# Patient Record
Sex: MALE | Race: WHITE
[De-identification: names, ages, dates, MRNs, and addresses within clinical notes are randomized per-mention and may not be internally consistent; named-entity substitution may affect disease eponyms.]

---

## 2017-01-27 ENCOUNTER — HOSPITAL ENCOUNTER (EMERGENCY)
Dept: HOSPITAL 25 - UCEAST | Age: 72
Discharge: HOME | End: 2017-01-27
Payer: MEDICARE

## 2017-01-27 VITALS — DIASTOLIC BLOOD PRESSURE: 71 MMHG | SYSTOLIC BLOOD PRESSURE: 156 MMHG

## 2017-01-27 DIAGNOSIS — M25.532: Primary | ICD-10-CM

## 2017-01-27 DIAGNOSIS — M19.032: ICD-10-CM

## 2017-01-27 DIAGNOSIS — Z88.5: ICD-10-CM

## 2017-01-27 DIAGNOSIS — Z88.8: ICD-10-CM

## 2017-01-27 DIAGNOSIS — M79.645: ICD-10-CM

## 2017-01-27 PROCEDURE — G0463 HOSPITAL OUTPT CLINIC VISIT: HCPCS

## 2017-01-27 PROCEDURE — 99213 OFFICE O/P EST LOW 20 MIN: CPT

## 2017-01-27 NOTE — RAD
HISTORY: Subacute trauma, persistent left wrist pain



COMPARISONS: None



VIEWS: 3, Frontal, lateral, and oblique views left wrist



FINDINGS:



BONE DENSITY: Normal.

BONES: There is a probable nondisplaced fracture of the scaphoid.    

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

articulations.    

ALIGNMENT: There is no dislocation. 

SOFT TISSUES: Unremarkable.



OTHER FINDINGS: None.



IMPRESSION: 

OSTEOARTHRITIS WITH A PROBABLE NONDISPLACED FRACTURE OF THE SCAPHOID

## 2017-01-27 NOTE — UC
Hand/Wrist HPI





- HPI Summary


HPI Summary: 


FOOSH LEFT HAND 1 MONTH AGO AFTER SLIPPING ON ICE. WRIST PAIN HAS IMPROVED BUT 

PT HAS PERSISTENT PAIN AND SWELLING IN THUMB. 





- History Of Current Complaint


Chief Complaint: UCUpperExtremity


Stated Complaint: WRIST INJURY


Time Seen by Provider: 01/27/17 08:36


Hx Obtained From: Patient


Onset/Duration: Sudden Onset, Lasting Weeks, Still Present


Severity Initially: Moderate


Severity Currently: Mild


Pain Intensity: 1


Pain Scale Used: 0-10 Numeric


Character Of Pain: Aching


Aggravating Factor(s): Movement


Alleviating: Rest


Associated Signs And Symptoms: Positive: Swelling


Related History: Dominant Hand Right





- Allergies/Home Medications


Allergies/Adverse Reactions: 


 Allergies











Allergy/AdvReac Type Severity Reaction Status Date / Time


 


Colesevelam [From Welchol] Allergy Severe Diarrhea Verified 01/27/17 08:16


 


Oxycodone Allergy Intermediate Rash Verified 01/27/17 08:16


 


Statins Allergy Intermediate Rash Verified 01/27/17 08:16


 


Dust Mite Extract Allergy Mild See Comment Verified 01/27/17 08:16


 


Molds & Smuts Allergy Mild Rash Verified 01/27/17 08:16


 


Atorvastatin [From Lipitor] Allergy  Unknown Verified 01/27/17 08:16





   Reaction  





   Details  


 


Cholestyramine Allergy  Rash Verified 01/27/17 08:16


 


Fenofibrate Allergy  Unknown Verified 01/27/17 08:16





   Reaction  





   Details  


 


Gemfibrozil Allergy  Unknown Verified 01/27/17 08:16





   Reaction  





   Details  


 


Niacin [From Niaspan] Allergy  Unknown Verified 01/27/17 08:16





   Reaction  





   Details  


 


Pravastatin Allergy  Unknown Verified 01/27/17 08:16





   Reaction  





   Details  


 


Simvastatin [From Zocor] Allergy  Unknown Verified 01/27/17 08:16





   Reaction  





   Details  


 


Tamsulosin [From Flomax] Allergy  Unknown Verified 01/27/17 08:16





   Reaction  





   Details  


 


cat dander* Allergy Intermediate Sneezing Uncoded 11/09/16 15:31











Home Medications: 


 Home Medications





Docusate CAP* [Colace Cap*] 1 tab PO PRN 01/27/17 [History]


Metoprolol Tartrate TAB* [Lopressor TAB*] 1 tab PO DAILY 01/27/17 [History 

Confirmed 01/27/17]


Polyethylene Glycol 3350* [Miralax*] 1 packet PO PRN 01/27/17 [History]











PMH/Surg Hx/FS Hx/Imm Hx


Endocrine History Of: 


   Denies: Diabetes, Thyroid Disease


Cardiovascular History Of: Reports: Cardiac Disorders - poss will need pacemaker

, Hypertension


   Denies: Pacemaker/ICD


Respiratory History Of: Reports: Asthma


   Denies: COPD


GI/ History Of: 


   Denies: Ulcer, Renal Disease


Neurological History Of: Reports: TIA - 2015


Psychological History Of: Reports: Anxiety


Cancer History Of: Reports: Prostate Cancer





- Surgical History


Surgical History: Yes


Surgery Procedure, Year, and Place: 1990's Deviated septum surgery;.  2012 Jan 

- Bone spur removal Left foot.  VASECTOMY





- Family History


Known Family History: Positive: Cardiac Disease, Diabetes


Family History: Fhx of HLD





- Social History


Alcohol Use: None


Substance Use Type: None


Smoking Status (MU): Never Smoked Tobacco





- Immunization History


Most Recent Influenza Vaccination: 2016/2017





Review of Systems


Constitutional: Negative


Skin: Negative


Respiratory: Negative


Cardiovascular: Negative


Gastrointestinal: Negative


Musculoskeletal: Arthralgia, Decreased ROM, Edema


All Other Systems Reviewed And Are Negative: Yes





Physical Exam


Triage Information Reviewed: Yes


Appearance: Well-Appearing, No Pain Distress, Well-Nourished


Vital Signs: 


 Initial Vital Signs











Temp  96.9 F   01/27/17 08:17


 


Pulse  64   01/27/17 08:17


 


Resp  17   01/27/17 08:17


 


BP  156/71   01/27/17 08:17


 


Pulse Ox  98   01/27/17 08:17











Vital Signs Reviewed: Yes


Eyes: Positive: Conjunctiva Clear


ENT: Positive: Hearing grossly normal


Neck: Positive: Supple


Respiratory: Positive: No respiratory distress, No accessory muscle use


Cardiovascular: Positive: Pulses Normal


Abdomen Description: Positive: Soft


Musculoskeletal: Positive: ROM Limited @ - LEFT THUMB AND WRIST, Edema @ - 

SLIGHT SWELLING LEFT 1ST MCP JOINT, Other: - NEG FINKELSTEINS. MILD TENDERNESS 

OVER CARPAL BONES AND OVER ANATOMICAL SNUFFBOX LEFT WRIST


Neurological: Positive: Alert


Psychological: Positive: Age Appropriate Behavior


Skin: Negative: rashes





Diagnostics





- Radiology


  ** LEFT WRIST XRAY


Xray Interpretation: Positive (See Comments) - OSTEOARTHRITIS WITH A PROBABLE 

NONDISPLACED FRACTURE OF THE SCAPHOID


Radiology Interpretation Completed By: Radiologist





  ** LEFT THUMB XRAY


Xray Interpretation: No Acute Changes


Radiology Interpretation Completed By: Radiologist





Hand/Wrist Course/Dx





- Differential Dx/Diagnosis


Provider Diagnoses: LEFT SCAPHOID FRACTURE





Discharge





- Discharge Plan


Condition: Stable


Disposition: HOME


Patient Education Materials:  Scaphoid Fracture (ED)


Referrals: 


Rashaad Vital MD [Medical Doctor] - 1 Week


Hal Hinojosa MD [Primary Care Provider] - If Needed


Additional Instructions: 


KEEP THE SPLINT ON AT ALL TIMES UNTIL OTHERWISE ADVISED BY ORTHO. OKAY TO 

REMOVE FOR A QUICK SHOWER.

## 2017-01-27 NOTE — RAD
Indication: Fall, persistent left thumb pain.



3 views of the left thumb demonstrates no fracture. There is bony fragment which may be

sequela from prior injury at the dorsal aspect of the interphalangeal joint. Degenerative

changes of the trapezium first metacarpal joint is noted.



IMPRESSION: Likely sequela of prior injury with no definite recent fracture.

## 2017-02-17 ENCOUNTER — HOSPITAL ENCOUNTER (OUTPATIENT)
Dept: HOSPITAL 25 - ED | Age: 72
Setting detail: OBSERVATION
Discharge: HOME | End: 2017-02-17
Attending: HOSPITALIST | Admitting: HOSPITALIST
Payer: MEDICARE

## 2017-02-17 VITALS — SYSTOLIC BLOOD PRESSURE: 166 MMHG | DIASTOLIC BLOOD PRESSURE: 79 MMHG

## 2017-02-17 DIAGNOSIS — R07.9: Primary | ICD-10-CM

## 2017-02-17 DIAGNOSIS — Z88.8: ICD-10-CM

## 2017-02-17 DIAGNOSIS — R00.1: ICD-10-CM

## 2017-02-17 DIAGNOSIS — F41.9: ICD-10-CM

## 2017-02-17 DIAGNOSIS — Z79.899: ICD-10-CM

## 2017-02-17 DIAGNOSIS — K21.9: ICD-10-CM

## 2017-02-17 DIAGNOSIS — M19.032: ICD-10-CM

## 2017-02-17 DIAGNOSIS — Z79.82: ICD-10-CM

## 2017-02-17 DIAGNOSIS — I10: ICD-10-CM

## 2017-02-17 DIAGNOSIS — E78.5: ICD-10-CM

## 2017-02-17 DIAGNOSIS — Z85.46: ICD-10-CM

## 2017-02-17 LAB
ALBUMIN SERPL BCG-MCNC: 3.9 G/DL (ref 3.2–5.2)
ALP SERPL-CCNC: 81 U/L (ref 34–104)
ALT SERPL W P-5'-P-CCNC: 14 U/L (ref 7–52)
ANION GAP SERPL CALC-SCNC: 7 MMOL/L (ref 2–11)
AST SERPL-CCNC: 15 U/L (ref 13–39)
BUN SERPL-MCNC: 10 MG/DL (ref 6–24)
BUN/CREAT SERPL: 13.3 (ref 8–20)
CALCIUM SERPL-MCNC: 9.5 MG/DL (ref 8.6–10.3)
CHLORIDE SERPL-SCNC: 105 MMOL/L (ref 101–111)
GLOBULIN SER CALC-MCNC: 2.9 G/DL (ref 2–4)
GLUCOSE SERPL-MCNC: 96 MG/DL (ref 70–100)
HCO3 SERPL-SCNC: 28 MMOL/L (ref 22–32)
HCT VFR BLD AUTO: 40 % (ref 42–52)
HGB BLD-MCNC: 13.6 G/DL (ref 14–18)
MAGNESIUM SERPL-MCNC: 2.3 MG/DL (ref 1.9–2.7)
MCH RBC QN AUTO: 30 PG (ref 27–31)
MCHC RBC AUTO-ENTMCNC: 34 G/DL (ref 31–36)
MCV RBC AUTO: 89 FL (ref 80–94)
POTASSIUM SERPL-SCNC: 3.4 MMOL/L (ref 3.5–5)
PROT SERPL-MCNC: 6.8 G/DL (ref 6.4–8.9)
RBC # BLD AUTO: 4.55 10^6/UL (ref 4–5.4)
SODIUM SERPL-SCNC: 140 MMOL/L (ref 133–145)
TROPONIN I SERPL-MCNC: 0 NG/ML (ref ?–0.04)
WBC # BLD AUTO: 5.2 10^3/UL (ref 3.5–10.8)

## 2017-02-17 PROCEDURE — 84484 ASSAY OF TROPONIN QUANT: CPT

## 2017-02-17 PROCEDURE — 93005 ELECTROCARDIOGRAM TRACING: CPT

## 2017-02-17 PROCEDURE — 71020: CPT

## 2017-02-17 PROCEDURE — 36415 COLL VENOUS BLD VENIPUNCTURE: CPT

## 2017-02-17 PROCEDURE — 99284 EMERGENCY DEPT VISIT MOD MDM: CPT

## 2017-02-17 PROCEDURE — 93017 CV STRESS TEST TRACING ONLY: CPT

## 2017-02-17 PROCEDURE — G0378 HOSPITAL OBSERVATION PER HR: HCPCS

## 2017-02-17 PROCEDURE — A9502 TC99M TETROFOSMIN: HCPCS

## 2017-02-17 PROCEDURE — 85025 COMPLETE CBC W/AUTO DIFF WBC: CPT

## 2017-02-17 PROCEDURE — 83605 ASSAY OF LACTIC ACID: CPT

## 2017-02-17 PROCEDURE — 80053 COMPREHEN METABOLIC PANEL: CPT

## 2017-02-17 PROCEDURE — 96372 THER/PROPH/DIAG INJ SC/IM: CPT

## 2017-02-17 PROCEDURE — 78452 HT MUSCLE IMAGE SPECT MULT: CPT

## 2017-02-17 PROCEDURE — 83735 ASSAY OF MAGNESIUM: CPT

## 2017-02-17 PROCEDURE — 85610 PROTHROMBIN TIME: CPT

## 2017-02-17 NOTE — RAD
INDICATION: Chest pain



COMPARISON: Most recent comparison chest x-rays dated November 04, 2016



TECHNIQUE: PA and lateral views of the chest were obtained.



FINDINGS:



The heart and mediastinum are normal in size and contour.



The lungs are grossly clear. There is no evidence of large pleural effusion.



Visualized bones are normal for the patient's age.



There is no radiographic evidence of free air beneath the diaphragm



IMPRESSION: 

No radiographic evidence of acute cardiopulmonary disease.

## 2017-02-17 NOTE — ED
Hernan MAE Billy, scribed for Delroy Lopes MD on 02/17/17 at 0452 .





HPI Chest Pain





- HPI Summary


HPI Summary: 


Patient is a 71 year-old male coming to [facility] presenting with constant left 

anterior chest pain since 0240 this morning. Severity 2/10. He states that the 

pain is worse with deep breaths. He has had similar symptoms in the past before

, although he cannot recall his last episode prior to today. He is unable to 

describe the quality of his pain today, but he states it is "constant but not 

stabbing." ASA taken PTA. He has had 2 weeks of cold-like symptoms prior to 

today. Denies any fevers, although Dr. Hinojosa recently prescribed him 

antibiotics. Follows Dr. Smith for cardiology.





- History of Current Complaint


Chief Complaint: EDChestPainROMI


Time Seen by Provider: 02/17/17 04:48


Hx Obtained From: Patient


Onset/Duration: Started Hours Ago, Still Present


Time of Onset: 02:40


Timing: Constant


Initial Severity: Moderate


Current Severity: Moderate


Pain Intensity: 2


Pain Scale Used: 0-10 Numeric


Chest Pain Location: Left Anterior


Chest Pain Radiates: No


Aggravating Factor(s): Deep Breaths


Alleviating Factor(s): Nothing


Associated Signs and Symptoms: Positive: Chest Pain





- Additional Pertinent History


Primary Care Physician: JUSTEN





- Allergy/Home Medications


Allergies/Adverse Reactions: 


 Allergies











Allergy/AdvReac Type Severity Reaction Status Date / Time


 


Colesevelam [From Welchol] Allergy Severe Diarrhea Verified 02/17/17 05:47


 


Oxycodone Allergy Intermediate Rash Verified 02/17/17 05:47


 


Statins Allergy Intermediate Rash Verified 02/17/17 05:47


 


Dust Mite Extract Allergy Mild See Comment Verified 02/17/17 05:47


 


Molds & Smuts Allergy Mild Rash Verified 02/17/17 05:47


 


Atorvastatin [From Lipitor] Allergy  Unknown Verified 02/17/17 05:47





   Reaction  





   Details  


 


Cholestyramine Allergy  Rash Verified 02/17/17 05:47


 


Fenofibrate Allergy  Unknown Verified 02/17/17 05:47





   Reaction  





   Details  


 


Gemfibrozil Allergy  Unknown Verified 02/17/17 05:47





   Reaction  





   Details  


 


Niacin [From Niaspan] Allergy  Unknown Verified 02/17/17 05:47





   Reaction  





   Details  


 


Pravastatin Allergy  Unknown Verified 02/17/17 05:47





   Reaction  





   Details  


 


Simvastatin [From Zocor] Allergy  Unknown Verified 02/17/17 05:47





   Reaction  





   Details  


 


Tamsulosin [From Flomax] Allergy  Unknown Verified 02/17/17 05:47





   Reaction  





   Details  


 


cat dander* Allergy Intermediate Sneezing Uncoded 02/17/17 05:47











Home Medications: 


 Home Medications





Verapamil SR TAB* [Calan Sr TAB*] 120 mg PO QPM 02/17/17 [History Confirmed 02/ 17/17]











PMH/Surg Hx/FS Hx/Imm Hx


Endocrine/Hematology History: 


   Denies: Hx Diabetes, Hx Thyroid Disease


Cardiovascular History: Reports: Hx Hypercholesterolemia, Hx Hypertension


   Denies: Hx Pacemaker/ICD


Respiratory History: Reports: Hx Asthma


   Denies: Hx Chronic Obstructive Pulmonary Disease (COPD)


GI History: 


   Denies: Hx Ulcer


 History: Reports: Other  Problems/Disorders - prostate CA in 2015


   Denies: Hx Renal Disease


Sensory History: Reports: Hx Contacts or Glasses


   Denies: Hx Hearing Aid


Opthamlomology History: Reports: Hx Contacts or Glasses


Neurological History: Reports: Hx Transient Ischemic Attacks (TIA) - 2015


Psychiatric History: Reports: Hx Anxiety


   Denies: Hx Panic Disorder





- Cancer History


Cancer Type, Location and Year: prostate CA, 2015





- Surgical History


Surgery Procedure, Year, and Place: 1990's Deviated septum surgery;.  2012 Jan 

- Bone spur removal Left foot.  VASECTOMY


Infectious Disease History: Yes


Infectious Disease History: Reports: Hx Shingles - 2014


   Denies: Hx Clostridium Difficile, Hx Hepatitis, Hx Human Immunodeficiency 

Virus (HIV), Hx of Known/Suspected MRSA, Hx Tuberculosis, Hx Known/Suspected VRE

, Hx Known/Suspected VRSA, History Other Infectious Disease, Traveled Outside 

the [place] in Last 30 Days





- Family History


Known Family History: Positive: Cardiac Disease, Diabetes


Family History: Fhx of HLD





- Social History


Alcohol Use: None


Substance Use Type: Reports: None


Hx Tobacco Use: No


Smoking Status (MU): Never Smoked Tobacco





Review of Systems


Negative: Fever


Positive: Chest Pain


Positive: Other - left wrist sprain


All Other Systems Reviewed And Are Negative: Yes





Physical Exam


Triage Information Reviewed: Yes


Vital Signs On Initial Exam: 


 Initial Vitals











Temp Pulse Resp BP Pulse Ox


 


 97.9 F   62   18   154/90   98 


 


 02/17/17 04:28  02/17/17 04:28  02/17/17 04:28  02/17/17 04:28  02/17/17 04:28











Vital Signs Reviewed: Yes


Appearance: Positive: Well-Appearing, No Pain Distress


Skin: Positive: Warm


Head/Face: Positive: Normal Head/Face Inspection


Eyes: Positive: PANCHO


ENT: Positive: Hearing grossly normal


Neck: Positive: Supple


Respiratory/Lung Sounds: Positive: Clear to Auscultation, Breath Sounds Present


Cardiovascular: Positive: RRR


Abdomen Description: Positive: Nontender, Soft


Bowel Sounds: Positive: Present


Musculoskeletal: Positive: Strength/ROM Intact


Neurological: Positive: Sensory/Motor Intact, Alert, Oriented to Person Place, 

Time





Diagnostics





- Vital Signs


 Vital Signs











  Temp Pulse Resp BP Pulse Ox


 


 02/17/17 04:28  97.9 F  62  18  154/90  98














- Laboratory


Result Diagrams: 


 02/17/17 05:15





 02/17/17 05:15


Lab Statement: Any lab studies that have been ordered have been reviewed, and 

results considered in the medical decision making process.





- Radiology


  ** CXR


Xray Interpretation: No Acute Changes


Radiology Interpretation Completed By: ED Physician





- EKG


  ** 0443


EKG Interpretation: NSR 60 bpm, non-specific ST abnormalities.





Chest Pain Course/Dx





- Diagnoses


Provider Diagnoses: 


 ACS (acute coronary syndrome)








- Provider Notifications


Discussed Care Of Patient With: Dr. Pederson (hospitalist) @ 0545: accepts 

admission.





Discharge





- Discharge Plan


Condition: Fair


Disposition: ADMITTED TO [facility]


Referrals: 


Hal Hniojosa MD [Primary Care Provider] - 





The documentation as recorded by the Hernan leung Billy accurately reflects the 

service I personally performed and the decisions made by me, Delroy Lopes MD.

## 2017-02-17 NOTE — DCNOTE
Patient seen this afternoon. No recurrence of symptoms. Feels well.


On exam, RRR, s1 and s2 present, SASHA, lungs cta B/L, abd soft, NTND, BS+


D/C home with PCP f/u. Complete outpatient ABx therapy.

## 2017-02-17 NOTE — HP
HISTORY AND PHYSICAL:

 

DATE OF ADMISSION:  17

 

CHIEF COMPLAINT:  Chest pain.

 

HISTORY OF PRESENT ILLNESS:  The patient is a 71-year-old gentleman who said 
about 2:40 this morning he woke up to urinate which he often does, and then 
suddenly felt some chest pain.  He was not exerting himself.  The patient was 
about 2/10 in severity.  It was left hand side of his chest, and was an achy 
type pain.  He has had no radiation of the pain.  No shortness of breath.  He 
was not sweating.  He was not dizzy.  When he was got to the hospital, his 
anxiety was relieved and his pain resolved without any medications.  It should 
be noted he worked out just yesterday for 45 minutes with no pain.

 

PAST MEDICAL HISTORY:  Significant for hypertension, hyperlipidemia, prostate 
cancer, status post radiation therapy in , BPH, anxiety, and GERD.

 

MEDICATIONS:  His current medications are as follows:

1.  Verapamil 120 mg in the evening.

2.  Astepro one spray both nares twice daily.

3.  Aspirin 81 mg daily.

4.  Albuterol inhaler one puff four times a day as needed.

5.  Docusate one tab twice a day.

6.  Calcium carbonate with cholecalciferol one tablet twice a week.

7.  Lisinopril 40 mg daily.

8.  Evolocumab 140 mg subcu q. 14 days.

9.  Flaxseed two capsules daily.

10.  Losartan 50 mg twice daily.

11.  Ibuprofen 200 mg every 8 hours as needed.

12.  Metoprolol tartrate one tab daily.

13.  Potassium chloride 20 mEq twice daily.

14.  MiraLax one pack daily.

15.  Verapamil 240 mg in the morning.

16.  Dyazide 37.5/25 one capsule daily.

17.  Clonazepam 0.5 mg three times a day as needed.

18.  Tylenol 650 mg every 6 hours as needed.

 

ALLERGIES/ADVERSE REACTIONS:  

1.  WELCHOL.

2.  OXYCODONE.

3.  STATINS.

4.  DUST MITE EXTRACT, MOLDS AND SMUTS.

5.  LIPITOR.

6.  CHOLESTYRAMINE.

7.  FENOFIBRATE.

8.  GEMFIBROZIL.

9.  NIACIN.

10. PRAVASTATIN.

11. SIMVASTATIN.

12. TAMSULOSIN.

13. CAT DANDER.

 

FAMILY HISTORY:  Mother  at 83 from diabetes and MI.  Father  at age 67 
of an unknown cause.

 

SOCIAL HISTORY:  No tobacco, alcohol, or recreational drug use.  Retired.  
Works for [employer].  He is .  His wife, Monse Herrera, is his 
healthcare proxy.  He has two children.

 

REVIEW OF SYSTEMS:  A 14-point review of systems was completed with the 
patient. All pertinent positives and negatives are in the history of present 
illness; otherwise is negative.

 

                               PHYSICAL EXAMINATION

 

GENERAL:  Pleasant gentleman lying in bed, in no acute distress.

 

VITAL SIGNS:  Blood pressure 150/74, pulse ox 97%, respiratory rate 14 breaths 
per minute, heart rate 65 beats per minute, temperature 97.9 degrees.

 

HEENT:  Normocephalic, atraumatic.  Pupils are equal, round, and reactive to 
light. Moist mucous membranes.

 

NECK:  Supple.  No JVD, bruits, palpable thyroid, or lymphadenopathy.

 

CHEST:  Clear to auscultation and percussion bilaterally.

 

CARDIOVASCULAR:  S1, S2 appreciated.  Regular rate and rhythm.

 

ABDOMEN:  Positive bowel sounds in all 4 quadrants.  Soft, nontender, and 
nondistended.  No hepatosplenomegaly.

 

EXTREMITIES:  No cyanosis, clubbing, or edema; +2 peripheral pulses bilaterally.

 

NEUROLOGIC:  Alert and oriented x3.  Moves all extremities.

 

SKIN:  No rashes or abnormalities.

 

 DIAGNOSTIC STUDIES/LAB DATA:  White count 5.2, hemoglobin 13.6, hematocrit 40, 
platelets 317.  Sodium 140, potassium 3.4, chloride 105, CO2 28, BUN 10, 
creatinine 0.75, glucose 96.  INR 0.89.

 

Chest x-ray, preliminary - it looks like no apparent infiltrates.

 

EKG shows normal sinus rhythm at 60 beats per minute.  He has normal axis.  No 
acute ST-T wave changes.

 

ASSESSMENT AND PLAN:

1.  Chest pain.  I think it is unlikely to be cardiac in etiology.  It was 
nonexertional.  It is fairly atypical.  However, he does have risk factors, and 
hasn't had a stress test in over a year.  I will rule him out for an MI, get a 
nuclear stress test.  If that is negative, he can likely leave later today.

2.  Hypertension:  Not well controlled.  Continue current regimen and adjust 
medications accordingly.

3.  Hyperlipidemia:  He has an ALLERGY TO STATINS, mostly diet controlled, 
monitor.

4.  Fluids, Electrolytes, Nutrition:  NPO.  Awaiting stress test.

5.  Deep venous thrombosis prophylaxis:  Heparin subcu.

6.  The patient is a full code.

 

TIME SPENT:  Over 75 minutes were spent on this H and P, more than 40 minutes 
of which were spent in direct face-to-face contact with the patient in 
evaluation, physical exam, counseling, and coordination of care.

 

CC:  Dr. Hal Hinojosa* 

 

[id number]/CPS #: [id number]

MTDD

## 2017-02-17 NOTE — RAD
Edited for charges.



INDICATION: Chest pain, hypertension, family history of heart disease.



COMPARISON: None.



TECHNIQUE: 10.100 mCi of Tc-99m Myoview were administered IV. SPECT images of 
the heart

were obtained.



Later on the same day, under the direction of Dr. Conner, the patient was given 
an IV

injection of a pharmacologic stress agent. Subsequently, the patient was given 
an IV

injection of 25.900 mCi Tc-99m Myoview. SPECT images of the heart were obtained 
and a

gated wall motion study was performed.



FINDINGS:  Gated wall motion images were obtained at stress and demonstrate 
wall motion to

be within normal limits.  Calculated left ventricular ejection fraction is 78 % 
at stress.

Estimated LEFT ventricular end diastolic volume is 61 mL. TID 1.0.   



Based on review of the attenuation corrected and non corrected images the 
distribution of

radiopharmaceutical within the myocardium on the stress and rest images is 
within normal

limits. No fixed or reversible regions of hypoperfusion evident.



IMPRESSION:  

1. No evidence for stress induced myocardial ischemia or presence of an infarct.

2. Normal left ventricular wall motion and ejection fraction.



ASSESSMENT:  Low risk.



Based on imaging criteria from ACC/AHA 2002 Guideline Update for the Management 
of

Patients With Chronic Stable Angina Table 23. Noninvasive Risk Stratification.



MTDD

## 2017-10-02 ENCOUNTER — HOSPITAL ENCOUNTER (OUTPATIENT)
Dept: HOSPITAL 25 - OREAST | Age: 72
Discharge: HOME | End: 2017-10-02
Attending: OPHTHALMOLOGY
Payer: MEDICARE

## 2017-10-02 VITALS — SYSTOLIC BLOOD PRESSURE: 139 MMHG | DIASTOLIC BLOOD PRESSURE: 87 MMHG

## 2017-10-02 DIAGNOSIS — H25.13: Primary | ICD-10-CM

## 2017-10-02 DIAGNOSIS — F41.9: ICD-10-CM

## 2017-10-02 DIAGNOSIS — Z86.73: ICD-10-CM

## 2017-10-02 DIAGNOSIS — J45.909: ICD-10-CM

## 2017-10-02 DIAGNOSIS — E78.5: ICD-10-CM

## 2017-10-02 DIAGNOSIS — J30.1: ICD-10-CM

## 2017-10-02 DIAGNOSIS — I10: ICD-10-CM

## 2017-10-02 DIAGNOSIS — I47.1: ICD-10-CM

## 2017-10-02 DIAGNOSIS — Z85.46: ICD-10-CM

## 2017-10-02 DIAGNOSIS — Z88.8: ICD-10-CM

## 2017-10-02 DIAGNOSIS — Z79.82: ICD-10-CM

## 2017-10-02 PROCEDURE — V2632 POST CHMBR INTRAOCULAR LENS: HCPCS

## 2017-10-03 NOTE — OP
DATE OF OPERATION:  10/02/17 - [facility]

 

DATE OF BIRTH:  [birth year]

 

SURGEON:  Román Bernal MD



ANESTHESIOLOGIST:  Jerel Gutierrez MD

 

ANESTHESIA:  Monitored anesthesia care.

 

PRE-OP DIAGNOSIS:  Cataract, left eye.

 

POST-OP DIAGNOSIS:  Cataract, left eye.

 

OPERATIVE PROCEDURE:  Cataract surgery of left eye.

 

IMPLANTS:  SN60WF 14.0 diopter lens to the left eye.

 

COMPLICATIONS:  None.

 

DESCRIPTION OF PROCEDURE:  The patient was given phenylephrine 2.5% and 
cyclopentolate 1% eye drops to the operative eye in the preoperative area.  The 
patient was brought to the operating room where a time-out was taken to 
identify the correct patient, site, and side of the surgery.  The patient's 
left eye was prepped and draped in the usual sterile fashion with 5% Betadine.  
A second time- out was taken to verify the correct patient, site, and side of 
surgery, and correct lens selection.  A lid speculum was placed to the left 
eye.  A 1-mm paracentesis blade was used to make a clear corneal incision in 
the inferotemporal position.  Preservative-free 1% lidocaine was injected into 
the anterior chamber.  DisCoVisc was then injected into the anterior chamber.  
A 2.75-mm keratome blade was used to make a triplanar incision at the 
superotemporal position.  A cystotome initiated a capsulorrhexis, which was 
completed with Utrata forceps in a continuous and curvilinear manner.  
Hydrodissection of the lens was performed with BSS on a cannula.  The lens 
could be spun in the capsular bag.  The phacoemulsification handpiece was used 
with a divide-and-conquer technique to remove the nucleus in its entirety with 
18.42 CDE.  The I/A handpiece then removed the residual cortical lens material.
  DisCoVisc was injected to inflate the capsular bag.  The planned SN60WF 14.0 
diopter lens was injected into the capsular bag.  The residual DisCoVisc was 
removed from the eye with the I/A handpiece.  The corneal incisions were 
hydrated and no leaks occurred at physiologic pressure around 20 mmHg per 
palpation.  The lid speculum was removed and drapes removed.  Maxitrol ointment 
was placed on the surface of the operative eye.  An adhesive patch and shield 
was placed on the operative eye.  The patient was taken to the postoperative 
area in stable condition.

 

 [id number]/[facility] #: [id number]

[facility]

## 2017-10-09 ENCOUNTER — HOSPITAL ENCOUNTER (OUTPATIENT)
Dept: HOSPITAL 25 - OREAST | Age: 72
Discharge: HOME | End: 2017-10-09
Attending: OPHTHALMOLOGY
Payer: MEDICARE

## 2017-10-09 VITALS — DIASTOLIC BLOOD PRESSURE: 69 MMHG | SYSTOLIC BLOOD PRESSURE: 129 MMHG

## 2017-10-09 DIAGNOSIS — E78.5: ICD-10-CM

## 2017-10-09 DIAGNOSIS — H25.11: Primary | ICD-10-CM

## 2017-10-09 DIAGNOSIS — Z96.1: ICD-10-CM

## 2017-10-09 DIAGNOSIS — I10: ICD-10-CM

## 2017-10-09 DIAGNOSIS — J30.1: ICD-10-CM

## 2017-10-09 DIAGNOSIS — Z85.46: ICD-10-CM

## 2017-10-09 DIAGNOSIS — F41.9: ICD-10-CM

## 2017-10-09 DIAGNOSIS — Z86.73: ICD-10-CM

## 2017-10-09 DIAGNOSIS — Z88.8: ICD-10-CM

## 2017-10-09 DIAGNOSIS — Z79.82: ICD-10-CM

## 2017-10-09 PROCEDURE — V2632 POST CHMBR INTRAOCULAR LENS: HCPCS

## 2017-10-09 NOTE — OP
DATE OF OPERATION:  10/09/17 - [facility]

 

DATE OF BIRTH:  [birth year]

 

SURGEON:  Román Bernal MD



ANESTHESIOLOGIST:  Best Cohen DO

 

ANESTHESIA:  Monitored anesthesia care.

 

PRE-OP DIAGNOSIS:  Cataract, right eye.

 

POST-OP DIAGNOSIS:  Cataract, right eye.

 

OPERATIVE PROCEDURE:  Cataract surgery of the right eye.

 

IMPLANTS:  SN60WF 14.5 Diopter lens to the right eye.

 

COMPLICATIONS:  None.

 

DESCRIPTION OF PROCEDURE:  The patient was given phenylephrine 2.5% and 
cyclopentolate 1% eye drops to the operative eye in the preoperative area.  The 
patient was brought to the operating room, where a time-out was taken to 
identify the correct patient, site, and side of surgery.  The patient's right 
eye was prepped and draped in the usual sterile fashion with 5% Betadine.  A 
second time- out was taken to verify the correct patient, site, and side of 
surgery, and correct lens selection.  A lid speculum was placed to the right 
eye.  A 1-mm paracentesis blade was used to make a clear corneal incision in 
the superotemporal position. Preservative-free 1% lidocaine was injected into 
the anterior chamber.  DisCoVisc was then injected into the anterior chamber.  
A 2.75-mm keratome blade was used to make a triplanar incision at the 
inferotemporal position.  A cystotome initiated a capsulorrhexis, which was 
completed with Utrata forceps in a continuous and curvilinear manner.  
Hydrodissection of the lens was performed with BSS on a cannula.  The lens 
could be spun in the capsular bag.  The phacoemulsification handpiece was used 
with a divide-and-conquer technique to remove the nucleus in its entirety with 
21.93 CDE.  The I/A handpiece then removed the residual cortical lens material.
  DisCoVisc was injected to inflate the capsular bag.  The planned SN60WF 14.5 
diopter lens was injected into the capsular bag.  The residual DisCoVisc was 
removed from the eye with the I/A handpiece.  The corneal incisions were 
hydrated and no leaks occurred at physiologic pressure around 20 mmHg per 
palpation.  The lid speculum was removed and drapes removed.  Maxitrol ointment 
was placed on the surface of the operative eye.  An adhesive patch and shield 
was placed on the operative eye.  The patient was taken to the postoperative 
area in stable condition.

 

 [id number]/[facility] #: [id number]

[id number]

## 2018-05-04 ENCOUNTER — HOSPITAL ENCOUNTER (EMERGENCY)
Dept: HOSPITAL 25 - ED | Age: 73
Discharge: HOME | End: 2018-05-04
Payer: MEDICARE

## 2018-05-04 VITALS — SYSTOLIC BLOOD PRESSURE: 161 MMHG | DIASTOLIC BLOOD PRESSURE: 78 MMHG

## 2018-05-04 DIAGNOSIS — Z85.46: ICD-10-CM

## 2018-05-04 DIAGNOSIS — I10: Primary | ICD-10-CM

## 2018-05-04 DIAGNOSIS — R21: ICD-10-CM

## 2018-05-04 DIAGNOSIS — Z86.79: ICD-10-CM

## 2018-05-04 DIAGNOSIS — F41.9: ICD-10-CM

## 2018-05-04 DIAGNOSIS — Z87.19: ICD-10-CM

## 2018-05-04 LAB
BASOPHILS # BLD AUTO: 0 10^3/UL (ref 0–0.2)
EOSINOPHIL # BLD AUTO: 0.1 10^3/UL (ref 0–0.6)
HCT VFR BLD AUTO: 42 % (ref 42–52)
HGB BLD-MCNC: 14.3 G/DL (ref 14–18)
INR PPP/BLD: 0.89 (ref 0.77–1.02)
LYMPHOCYTES # BLD AUTO: 1.3 10^3/UL (ref 1–4.8)
MCH RBC QN AUTO: 31 PG (ref 27–31)
MCHC RBC AUTO-ENTMCNC: 34 G/DL (ref 31–36)
MCV RBC AUTO: 89 FL (ref 80–94)
MONOCYTES # BLD AUTO: 0.4 10^3/UL (ref 0–0.8)
NEUTROPHILS # BLD AUTO: 2.5 10^3/UL (ref 1.5–7.7)
NRBC # BLD AUTO: 0 10^3/UL
NRBC BLD QL AUTO: 0
PLATELET # BLD AUTO: 195 10^3/UL (ref 150–450)
RBC # BLD AUTO: 4.66 10^6/UL (ref 4–5.4)
WBC # BLD AUTO: 4.3 10^3/UL (ref 3.5–10.8)

## 2018-05-04 PROCEDURE — 85610 PROTHROMBIN TIME: CPT

## 2018-05-04 PROCEDURE — 84484 ASSAY OF TROPONIN QUANT: CPT

## 2018-05-04 PROCEDURE — 36415 COLL VENOUS BLD VENIPUNCTURE: CPT

## 2018-05-04 PROCEDURE — 83605 ASSAY OF LACTIC ACID: CPT

## 2018-05-04 PROCEDURE — 99284 EMERGENCY DEPT VISIT MOD MDM: CPT

## 2018-05-04 PROCEDURE — 84443 ASSAY THYROID STIM HORMONE: CPT

## 2018-05-04 PROCEDURE — 85730 THROMBOPLASTIN TIME PARTIAL: CPT

## 2018-05-04 PROCEDURE — 85025 COMPLETE CBC W/AUTO DIFF WBC: CPT

## 2018-05-04 PROCEDURE — 93005 ELECTROCARDIOGRAM TRACING: CPT

## 2018-05-04 PROCEDURE — 81003 URINALYSIS AUTO W/O SCOPE: CPT

## 2018-05-04 PROCEDURE — 80053 COMPREHEN METABOLIC PANEL: CPT

## 2018-05-04 PROCEDURE — 83735 ASSAY OF MAGNESIUM: CPT

## 2018-05-04 PROCEDURE — 71045 X-RAY EXAM CHEST 1 VIEW: CPT

## 2018-05-04 NOTE — UC
Allergic Reaction HPI





- HPI Summary


HPI Summary: 


Patient presents with hypertension, dry skin and urinary frequency with 

urgency.  He reports he took a rapid flow last night to aid in his partial 

urinary retention and has had these symptoms since.  his chart notes he has an 

allergy to this medication and so thinks that may be what is happening right 

now.  He denies facial swelling, throat tightness or dysphasia, trouble 

breathing or swallowing, wheezing, chest pain, chest tightness, headache, 

change in vision, numbness, tingling, weakness, nausea, vomiting, abdominal 

pain.  He has a history of prostate cancer and follows with Dr. montalvo who 

wrote prescription for him yesterday due to his urinary symptoms of retention. 





- History of Current Complaint


Chief Complaint: EDHypertension


Stated Complaint: HIGH BP


Time Seen by Provider: 05/04/18 06:07


Hx Obtained From: Patient, Family/Caretaker - wife


Pain Intensity: 0





- Allergies/Home Medications


Allergies/Adverse Reactions: 


 Allergies











Allergy/AdvReac Type Severity Reaction Status Date / Time


 


cholestyramine Allergy  Unknown Verified 05/04/18 05:52





   Reaction  





   Details  


 


colesevelam [From WelChol] Allergy  Diarrhea Verified 05/04/18 05:47


 


fenofibrate Allergy  Unknown Verified 05/04/18 05:54





   Reaction  





   Details  


 


gemfibrozil Allergy  Unknown Verified 05/04/18 05:54





   Reaction  





   Details  


 


mirabegron [From Myrbetriq] Allergy  Insomnia Verified 05/04/18 05:49


 


MS Atorvastatin Allergy  Unknown Verified 09/28/17 13:17





[From Lipitor]   Reaction  





   Details  


 


MS Gemfibrozil [Gemfibrozil] Allergy  Unknown Verified 09/28/17 13:17





   Reaction  





   Details  


 


niacin Allergy  Unknown Verified 05/04/18 05:50





   Reaction  





   Details  


 


oxycodone Allergy  Rash Verified 05/04/18 05:49


 


simvastatin [From Zocor] Allergy  Rash Verified 05/04/18 05:52


 


Statins-Hmg-Coa Reductase Allergy  Rash Verified 05/04/18 05:50





Inhibitor     


 


tamsulosin [From Flomax] Allergy  Unknown Verified 05/04/18 05:51





   Reaction  





   Details  


 


cat dander* Allergy Intermediate Sneezing Uncoded 09/28/17 13:17














PMH/Surg Hx/FS Hx/Imm Hx


Previously Healthy: Yes





- Surgical History


Surgical History: Yes


Surgery Procedure, Year, and Place: 1990's Deviated septum surgery;.  2012 Jan 

- Bone spur removal Left foot.  VASECTOMY.  colonoscopy w/snare polypectomy 

2015.  Ling event monitor implantation 2/2017





- Family History


Known Family History: Positive: Cardiac Disease, Diabetes


Family History: Fhx of HLD





- Social History


Alcohol Use: Daily


Alcohol Amount: 2 oz. jarad a day


Substance Use Type: None


Smoking Status (MU): Never Smoked Tobacco





- Immunization History


Most Recent Influenza Vaccination: 2016/2017





Physical Exam


Vital Signs: 


 Initial Vital Signs











Temp  98.1 F   05/04/18 05:43


 


Pulse  75   05/04/18 05:43


 


Resp  16   05/04/18 05:43


 


BP  191/95   05/04/18 05:43


 


Pulse Ox  99   05/04/18 05:43














Discharge





- Discharge Plan


Referrals: 


Hal Hinojosa MD [Primary Care Provider] -

## 2018-05-04 NOTE — XMS REPORT
Mario Omer

 Created on:2018



Patient:Mario Omer

Sex:Male

:1945

External Reference #:[id number]





Demographics







 Address  [address]



   [place] [address]

 

 Home Phone  [phone]

 

 Mobile Phone  [phone]

 

 Email Address  [email or web address]

 

 Preferred Language  English

 

 Marital Status  Not  Or 

 

 Druze Affiliation  Unknown

 

 Race  White

 

 Ethnic Group  Not  Or 









Author







 Organization  [facility]

 

 Address  [address] [address]

 

 Phone  [phone]









Support







 Name  Relationship  Address  Phone

 

 Monse Herrera  Unavailable  Unavailable  [phone]









Care Team Providers







 Name  Role  Phone

 

 Hal Hinojosa MD  Primary Care Physician  Unavailable









Payers







 Type  Date  Identification Numbers  Payment Provider  Subscriber

 

 Medicare Primary  Effective:  Policy Number:  Medicare  Mario Omer



   2008  [id number]    









 PayID: [id number]  [address]









 [place] [address]









 Medigap Part B    Policy Number: [id number]  [facility]  ([place])  Mario Omer









 PayID:   [address] [address]









 [place] [address]









 Medigap Part B  Expires: 10/31/2008  Policy Number:  [employer]  Mario Omer



     [id number]    









 Group Number: [id number]  [address]

 

 PayID: [id number]  [place] [address]







Problems







 Date  Description  Provider  Status

 

 Onset: 2012  Benign essential hypertension  Qutaybeh S. Maghaydah, Active M.D.  

 

 Onset: 2012  Hyperlipidemia  Qutaybeh S. Maghaydah, Active M.D.  

 

 Onset: 2013  Electrocardiogram abnormal  Qutaybeh S. Maghaydah, Active M.D.  

 

 Onset: 2014  Heart murmur  SATYA Marquez  Active

 

 Onset: 2014  Malaise and fatigue  SATYA Marquez  Active

 

 Onset: 02/15/2016  Essential hypertension  SATYA Marquez  Active

 

 Onset: 2017  Localized, primary osteoarthritis  Darrius Zepeda MD  
Active



   of the hand    







Social History







 Type  Date  Description  Comments

 

 Marital Status      

 

 Lives With    Wife  

 

 Occupation    Retired  

 

 ETOH Use    Liquor  2 ounces daily

 

 Smoking    Patient has never smoked  

 

 Recreational Drug Use    Denies Drug Use  

 

 Daily Caffeine    Comsumes on average 1 cup of decaff  



     coffee per day  

 

 Exercise Type/Frequency    Exercises regularly  







Allergies, Adverse Reactions, Alerts







 Date  Description  Reaction  Status  Severity  Comments

 

 2009  Zocor    active    skin rash

 

 06/15/2009  Gemfibrozil    active    rash and itching

 

 06/15/2009  Niaspan    active    rash and itching

 

 2011  Pravastatin  rash, rash  active    

 

 2014  Fenofibrate  fatigue, headaches  active    

 

 2014  Lipitor  rash  active    

 

 2014  Cholestyramine  mouth sores,  active    



     constipation      

 

 2015  Flomax    active    

 

 10/25/2007  NKDA    inactive    

 

 06/15/2009  Zetia    inactive    rash and itching







Medications







 Medication  Date  Status  Form  Strength  Qnty  SIG  Indications  Ordering



                 Provider

 

 Amlodipine    Active  Tablets  5mg  30tab  1 tab by  I10  Sun DAMON



 Besylate          s  mouth every    Foster,



             day    N.P.

 

 Praluent  10/15  Active  Solution  75mg/ml  4ml  1 injection    Qutaybeh /2017    Pen-Inject      sc every 2    S.



             weeks    CELESTE Smith

 

 Triamterene/Hyd    Active  Capsules  37.5-25mg  90cap  1 PO qd    Qutayb



 rochlorothiaz        s      MARISOL Smith M.D.

 

 Metoprolol    Active  Tablets ER  25mg  90tab  1 by mouth    Qutaybeh



 Succinate ER      24HR    s  every day    MARISOL Smith M.D.

 

 Klor-Con M20    Active  Tablets ER  20Meq  180ta  1 by mouth    Qutayb        bs  twice a day    MARISOL Smith M.D.

 

 Aspirin Ec    Active  Tablets DR  81mg  90tab  1 tablet po    Qutayb        s  daily    MARISOL Smith M.D.

 

 Lisinopril    Active  Tablets  20mg  180ta  2 by mouth    Qutayb        bs  every day    MARISOL Smith M.D.

 

 Albuterol    Active  Aerosol  90mcg/Act  1unit  one puff am,    Qutaybeh /2009        s  one puff pm    S.



             as needed    CELESTE Smith

 

 Fluticasone  00  Active  Suspension  50mcg/Act  1unit  1 spray am,    
Unknown



 Propionate          s  1 spray pm    



             as needed    

 

 Multivitamins  00  Active  Tablets    90tab  1 po once a    Unknown



   /0000        s  week    

 

 Verapamil HCL    Active  Tablets ER  240mg  135ta  take one tab    
Qutaybeh



 ER  /0000        bs  in the in    S.



             the morning    Sarah



             and 1/2 tab    MICHELLEDMiriam



             in the at    



             night    

 

 Calcium &amp;    Active  Liquid  1200mg    1 by mouth    Unknown



 Vitamin D3            once a week    

 

 Acetaminophen    Active  Tablets  325mg    2 tablets by    Unknown



   /          mouth every    



             6 hours as    



             needed for    



             pain/fever    

 

 Flaxseed Oil    Active    1000mg    twice a day    Unknown



                 

 

 Stool Softener    Active  Capsules  100mg    take 1 tab 4    Unknown



   /0000           times a    



             week    

 

 Magnesium    Active  Tablets  200mg    1 by mouth    Unknown



   /          once a week    



             with calcium    

 

 Glucosamine    Active  Capsules  500/400    1 tab    Unknown



 Chondroitin            alternating    



             with    



             calcium/magn    



             esium    

 

 Melatonin    Active  Tablets  3mg    once at    Unknown



   /0000          night    

 

 Azelastine HCL    Active  Solution  137mcg/Sp    1 squirt    Unknown



 (Nasal)        ray    each nostril    



             once a day    



             as needed    

 

 Omeprazole    Active  Capsules DR  20mg    1 by mouth    Unknown



   /          every day as    



             needed    

 

 Triamcinolone    Active  Cream  0.1%    Apply    Unknown



 Acetonide            Topically To    



             Affected    



             Area(S) Two    



             Times Daily    



             -- Avoid    



             Face And (on    



             hold    



             04/10/18)    

 

                 

 

 Repatha  02/15  Hx  Solution  140mg/ml    inject once  E78.5  Qutaybeh



 Sureclick      Auto-Inject    s  every 2    S.



   -          weeks    Sarah



   10/18              , M.D.



                 

 

 Cholestyramine    Hx  Packet  4gm  180pa  1 packet    Irene



           cks  once a day    Joshua Vail M.D.



   10/01              



   /2014              

 

 Welchol    Hx  Packet  3.75gm  30uni  Mix 1 packet    Markietayb        ts  with water    S.



   -          as directed    Sarah



             with the    , M.D.



   /2014          largest meal    



             of the day    

 

 Fenofibrate    Hx  Tablets  145mg  30tab  take 1 tab  272.4  yb        s  po QHS    S.



   -              Maghaydah



                 , M.D.



                 

 

 Potassium    Hx  Tablets ER  20Meq  30tab  1 po qd    Qutaybeh



 Chloride          s      S.



   -              Maghaydah



                 , M.D.



   /              

 

 Flonase    Hx  Suspension  50mcg/Act  1unit  2 intranasal    Qutayb        s  puffs to    S.



   -          each nostril    Maghaydah



             daily prn    , M.D.



   /              

 

 Zestril    Hx  Tablets  20mg    2 po qd    Qutaybeh



                 S.



   -              Maghaydah



                 , M.D.



   /              

 

 Pravastatin    Hx  Tablets  10mg    1 po qd    Qutaybeh



 Sodium                S.



   -              Maghaydah



                 , M.D.



                 

 

 Pravastatin    Hx  Tablets  10mg  45tab  one half po    Qutaybeh



 Sodium          s  qd    S.



   -              Maghaydah



                 , M.D.



                 

 

 Pravastatin    Hx  Tablets  10mg  30tab  1 po qhs    Qutaybeh



 Sodium          s      S.



   -              Maghaydah



                 , M.D.



                 

 

 Flax Seed Oil    Hx  Capsules  1200mg    1 po qd    Qutaybeh



                 S.



   -              Maghaydah



                 , M.D.



                 

 

 Simvastatin    Hx  Tablets  10mg  30tab  1 po hs    Qutaybeh



           s      S.



   -              Maghaydah



                 , M.D.



                 

 

 Fish Oil    Hx  Capsules  1200mg    1 po qd    Qutaybeh



                 S.



   -              Maghaydah



                 , M.D.



                 

 

 Simvastatin    Hx  Tablets  20mg  30tab  1 po qd    Qutaybeh



           s      S.



   -              Maghaydah



                 , M.D.



   /              

 

 Micardis    Hx  Tablets  80mg  90tab  1 po qd    Qutaybeh



           s      S.



   -              Maghaydah



                 , M.D.



   /              

 

 Micardis    Hx  Tablets  80mg  90tab  1 po qd Id #    Qutaybeh /2010        s  [id number]    S.



   -              Maghaydah



                 , M.D.



                 

 

 Micardis    Hx  Tablets  80mg    1 po qd    Qutayb              S.



   -              [facility]



                 , M.D.



                 

 

 Simvastatin    Hx  Tablets  10mg  90tab  1 po qhs    Johnny        s      ESEQUIEL Larose,



                 M.D.



                 

 

 Diovan    Hx  Tablets  80mg    1 po qd    Qutayb              S.



   -              [facility]



                 , M.D.



                 

 

 Diovan    Hx  Tablets  80mg  90tab  1 po qd    Qutayb        s      S.



   -              [facility]



                 , M.D.



                 

 

 Diovan    Hx  Tablets  80mg    po qam    tayb              S.



   [facility]



                 , M.[facility]



                 

 

 Simvastatin  10/30  Hx  Tablets  40mg  30tab  1 po qhs    Qutayb        s      S.



   -              Pac[facility]ah



                 , M.D.



                 

 

 Benicar    Hx  Tablets  40mg  30tab  1 po qd    Qutayb        s      S.



   -              [facility]



                 , [facility]



                 

 

 Crestor    Hx  Tablets  5mg  30tab  1 po qd    Qutayb        s      S.



   -              [facility]



   10/30              , M.D.



                 

 

 Lovaza    Hx  Capsules  1gm  60cap  1 po bid    Qutayb        s      S.



   -              [facility]



                 , M.D.



                 

 

 Gemfibrozil    Hx  Tablets  600mg  120ta  1 po bid    Qutayb        bs      S.



   -              [facility]



   06/15              , M.D.



                 

 

 OTC Fish Oil    Hx    1Gram    one po bid    Qutayb              S.



   -              [facility]



                 , M.D.



                 

 

 Lovaza    Hx  Capsules  1gm  180ca  1 po bid    Qutayb        ps      S.



   -              [facility]



                 , M.D.



                 

 

 Niaspan    Hx  Tablets ER  500mg  30tab  1tab pm    Qutayb        s      S.



   -              Maghaydah



                 , M.D.



   /              

 

 Atacand    Hx  Tablets  16mg  90tab  1 po qd    Qutaybeh



           s      S.



   -              Maghaydah



                 , M.D.



   /              

 

 Atacand    Hx  Tablets  32  30tab  1 qam    Qutayb        s      S.



   -              Maghaydah



                 , M.D.



   /              

 

 Zetia    Hx  Tablets  10mg  30tab  1 po qd    Qutaybeh



           s      S.



   -              Maghaydah



                 , M.D.



   /              

 

 K-Dur    Hx  Tablets ER  20Meq  90tab  1 po qd    Qutaybeh



           s      S.



   -              Maghaydah



                 , M.D.



   /              

 

 Zocor    Hx  Tablets  20mg  90tab  One QHS    Qutayb        s      S.



   -              Maghaydah



                 , M.D.



   /              

 

 Zestril  1025  Hx  Tablets  20mg  90tab  1 PO bid    Qutaybeh



           s      S.



   -              Maghaydah



                 , M.D.



   /              

 

 Calan SR  1025  Hx  Tablets ER  240mg  30tab  1 PO qd    Qutaybeh



           s      S.



   -              Maghaydah



                 , M.D.



   /              

 

 Triamterene/Hyd  1025  Hx  Capsules  37.5-25  90cap  1 PO qd    Qutaybeh



 rochlorothiazid          s      S.



 e  -              Maghaydah



                 , M.D.



   /              

 

 Klonopin  10/25  Hx  Tablets  1mg    1 PO qd    Qutaybeh



                 S.



   -              Maghaydah



                 , M.D.



   /              

 

 Allegra  10/25  Hx  Tablets    90tab  1 PO qd    Qutaybeh



           s      S.



   -              Maghaydah



                 , M.D.



   /              

 

 Singulair  10/25  Hx  Tablets  10mg  90tab  1 PO qd    Qutaybeh



           s      S.



   -              Maghaydah



                 , M.D.



   /              

 

 Nasonex  10/25  Hx  Suspension  50mcg/Act  3unit  2 Squirts    Qutaybeh



           s  Intranasal    S.



   -          qd    Maghaydramon



                 , M.CHANELL



   /              

 

 Mucinex  10/25  Hx  Tablets ER  600mg  90tab  1 PO bid    Qutayb



   /    12HR    s      S.



   -              Sarah



                 , M.CHANELL



   /              

 

 Benicar  00/00  Hx  Tablets  20mg  90tab  1 po qd    Unknown



   /0000        s      



   -              



                 

 

 Verapamil HCL  00/00  Hx  Tablets ER  240mg  90tab  1 po qd    Unknown



 CR  /        s      



   -              



                 

 

 Fish Oil  00/  Hx  Capsules  1200mg    2 po qd    Unknown



   /              



   -              



                 

 

 Klonopin  /  Hx  Tablets  0.5mg    tid    Unknown



   /              



   -              



                 

 

 Benadryl  00/  Hx  Capsules  25mg  30cap  1 po at hs    Unknown



   /        s  prn    



   -              



                 

 

 Zetia  /  Hx  Tablets  10mg  90tab  1 po qd - pt    Unknown



   /        s  will be    



   -          dc'ing after    



             rx runs out    



   /          after    



             14    

 

 Loratadine  00/  Hx  Tablets  10mg  30tab  1 po qd prn    Unknown



   /        s      



   -              



   10/20              



   /2014              

 

 Calcium  00/00  Hx    100    one tablet    Unknown



   /0000          once a week    



   -              



                 

 

 B Complex  00/  Hx  Capsules      1 po once a    Unknown



   /0000          week    



   -          (random)    



                 

 

 Magnesium  00/  Hx  Tablets  200mg    one po once    Unknown



 Citrate  /0000          a week    



   -              



                 

 

 Clonazepam  00/  Hx  Tablets  0.5mg    1/2 tab in    Unknown



   /0000          Am and 1 tab    



   -          at night    



                 

 

 Omeprazole  /  Hx  Capsules DR  20mg  90cap  1 po qd    Unknown



   /0000        s      



   -              



                 

 

 Verapamil HCL  00/00  Hx  Tablets ER  240mg  30tab  1 po qd    Unknown



 SA  /0000        s      



   -              



                 

 

 Klor-Con  00/00  Hx  Tablets ER    30tab  1 po qd    Unknown



   /        s      



   -              



                 

 

 Guaifenesin  00/  Hx  Tablets  400mg    bid    Unknown



   /              



   -              



   10/20              



   /2014              

 

 Losartan  00  Hx  Tablets  50mg  180ta  1 by mouth    Qutayblalit



 Potassium  /0000        bs  once a day    S.



   -              Sarah



                 , M.D.



   /              

 

 Flomax  00  Hx  Capsules  0.4mg  90cap  1 by mouth    Unknown



   /0000        s  every day    



   -              



                 

 

 Jublia    Hx  Solution  10%  4ml  apply 1 drop    Unknown



   /0000          to the nail,    



   -          spread it    



             around the    



   /2016          nail, every    



             day for 48    



             weeks. use 2    



             drops on the    



             big toe    

 

 Zestril    Hx    20mg    2 tabs daily    Unknown



   /0000              



   -              



                 

 

 Azelastine HCL    Hx  Solution  0.15%    use one    Unknown



 (Nasal)            spray in    



   -          each nostril    



             twice daily    



             prn    

 

 Pyridium    Hx  Tablets  100mg    one by mouth    Unknown



   /0000          three times    



   -          prn    



                 

 

 Oxybutynin    Hx  Tablets  5mg    1 by mouth    Unknown



 Chloride  0000          tid (during    



   -          tx) ( on    



             Hold)    



                 

 

 Rapaflo    Hx  Capsules  8mg    1 by mouth    Unknown



   /0000          every day    



   -              



                 

 

 Ibuprofen    Hx    200mg    1 po q 8h    Unknown



   /0000          prn    



   -              



                 

 

 Lupron  00  Hx            Unknown



 Injection  /0000              



   -              



                 

 

 Lupron  00/00  Hx        every 6    Unknown



 Injection  /0000          months    



   -              



                 

 

 Co Q 10    Hx  Capsules  100mg    1 by mouth    Unknown



   /0000          every day    



   -              



                 

 

 Miralax    Hx  Powder  3350NF    17 gm every    Unknown



   /0000          day mixed w/    



   -          8 oz    



             water/juice    



             as needed    







Medications Administered in Office







 Medication  Date  Status  Form  Strength  Qnty  SIG  Indications  Ordering



                 Provider

 

 Celestone 3 mg    Administered  Injection          Darrius



 and 3mg  017              MD Duane

 

 Technetium TC    Administered  Injection          Nathan DAMON



 99M  016              DO Valentin



 Tetrofosmin,                FACC



 Per Unit Dose                



 Up To 40                



 Millicuries                

 

 Technetium TC  10/31/2  Administered  Injection          Stiven LUA



 99M  014              CELESTE Prajapati



 Tetrofosmin,                



 Per Unit Dose                



 Up To 40                



 Millicuries                

 

 Technetium TC  10/31/2  Administered  Injection          Nishi Serrano,



 99M  014              PA



 Tetrofosmin,                



 Per Unit Dose                



 Up To 40                



 Millicuries                







Vital Signs







 Date  Vital  Result  Comment

 

 2018  Height  64 inches  5'4"









 Weight  194.00 lb  

 

 Heart Rate  60 /min  

 

 BP Systolic Sitting  134 mmHg  lue reg cuff

 

 BP Diastolic Sitting  70 mmHg  lue reg cuff

 

 BP Systolic Standing  134 mmHg  lue reg cuff

 

 BP Diastolic Standing  70 mmHg  lue reg cuff

 

 Respiratory Rate  16 /min  

 

 BMI (Body Mass Index)  33.3 kg/m2  

 

 Ejection Fraction  65%  2016 echo









 04/10/2018  Height  64 inches  5'4"









 Weight  198.00 lb  with shoes

 

 Heart Rate  56 /min  

 

 BP Systolic  140 mmHg  Home unit HR 55

 

 BP Diastolic  84 mmHg  Home unit HR 55

 

 BP Systolic Sitting  134 mmHg  Lue lrg cuff

 

 BP Diastolic Sitting  92 mmHg  Lue lrg cuff

 

 Respiratory Rate  16 /min  

 

 BMI (Body Mass Index)  34.0 kg/m2  

 

 Ejection Fraction  65%  2016-echo









 2018  Height  64 inches  5'4"









 Weight  199.00 lb  No shoes

 

 Heart Rate  62 /min  

 

 BP Systolic Sitting  125 mmHg  Lue lrg cuff

 

 BP Diastolic Sitting  64 mmHg  Lue lrg cuff

 

 Respiratory Rate  17 /min  

 

 BMI (Body Mass Index)  34.2 kg/m2  

 

 Ejection Fraction  60-65%  2016-echo









 2018  Height  54 inches  4'6"









 Weight  199.31 lb  No shoes

 

 Heart Rate  82 /min  

 

 BP Systolic Sitting  130 mmHg  rue reg cuff

 

 BP Diastolic Sitting  80 mmHg  rue reg cuff

 

 BP Systolic Standing  150 mmHg  rue reg cuf

 

 BP Diastolic Standing  70 mmHg  rue reg cuf

 

 Respiratory Rate  17 /min  

 

 BMI (Body Mass Index)  48.1 kg/m2  

 

 Ejection Fraction  65%  2016-Echo









 10/20/2017  Heart Rate  78 /min  









 Respiratory Rate  16 /min  

 

 Body Temperature  96.8 F  









 2017  Height  65 inches  5'5"









 Weight  193.25 lb  with shoes

 

 Heart Rate  66 /min  

 

 BP Systolic Sitting  136 mmHg  LA reg cuff

 

 BP Diastolic Sitting  72 mmHg  LA reg cuff

 

 BMI (Body Mass Index)  32.2 kg/m2  

 

 Ejection Fraction  60% - 65%  Luis 16  Height  65 inches  5'5"









 Weight  194.00 lb  

 

 BP Systolic  118 mmHg  

 

 BP Diastolic  72 mmHg  

 

 Respiratory Rate  16 /min  

 

 Body Temperature  96.5 F  

 

 Pain Level  2  

 

 BMI (Body Mass Index)  32.3 kg/m2  









 2017  Height  65 inches  5'5"









 Weight  191.75 lb  with shoes

 

 Heart Rate  66 /min  

 

 BP Systolic Sitting  124 mmHg  LA lrg cuff

 

 BP Diastolic Sitting  72 mmHg  LA lrg cuff

 

 BMI (Body Mass Index)  31.9 kg/m2  

 

 Ejection Fraction  60%-65%  Luis 16  Height  65 inches  5'5"









 Weight  196.00 lb  

 

 Heart Rate  78 /min  

 

 BP Systolic  120 mmHg  

 

 BP Diastolic  64 mmHg  

 

 Respiratory Rate  14 /min  

 

 Body Temperature  96.4 F  

 

 Pain Level  1  

 

 BMI (Body Mass Index)  32.6 kg/m2  









 2017  Height  65 inches  5'5"









 Weight  196.00 lb  w/ shoes

 

 Heart Rate  66 /min  reg

 

 BP Systolic Sitting  116 mmHg  Rue, reg cuff

 

 BP Diastolic Sitting  80 mmHg  Rue, reg cuff

 

 BP Systolic Standing  120 mmHg  Rue

 

 BP Diastolic Standing  80 mmHg  Rue

 

 Respiratory Rate  16 /min  

 

 BMI (Body Mass Index)  32.6 kg/m2  

 

 Ejection Fraction  &gt; 65%  as of 16 echo









 2017  Height  65 inches  5'5"









 Weight  195.00 lb  

 

 Heart Rate  64 /min  

 

 Respiratory Rate  16 /min  

 

 Pain Level  0  

 

 BMI (Body Mass Index)  32.4 kg/m2  









 02/10/2017  Height  65 inches  5'5"









 Weight  195.00 lb  

 

 Heart Rate  60 /min  

 

 BP Systolic Sitting  122 mmHg  Rue large cuff

 

 BP Diastolic Sitting  74 mmHg  Rue large cuff

 

 BP Systolic Standing  116 mmHg  

 

 BP Diastolic Standing  72 mmHg  

 

 Respiratory Rate  16 /min  

 

 BMI (Body Mass Index)  32.4 kg/m2  

 

 Ejection Fraction  65%  2017  Height  65 inches  5'5"









 Weight  195.00 lb  

 

 Heart Rate  60 /min  

 

 Respiratory Rate  16 /min  

 

 Pain Level  4  

 

 BMI (Body Mass Index)  32.4 kg/m2  









 2017  Height  64 inches  5'4"









 Weight  200.00 lb  with shoes

 

 Heart Rate  66 /min  

 

 BP Systolic Sitting  146 mmHg  Ra lrg cuff

 

 BP Diastolic Sitting  74 mmHg  Ra lrg cuff

 

 BMI (Body Mass Index)  34.3 kg/m2  

 

 Ejection Fraction  &gt;65%  echo 11/06/16









 12/15/2016  Height  64 inches  5'4"









 Weight  201.00 lb  with boots

 

 Heart Rate  74 /min  

 

 BP Systolic  162 mmHg  LA lrg cuff

 

 BP Diastolic  96 mmHg  LA lrg cuff

 

 BMI (Body Mass Index)  34.5 kg/m2  

 

 Ejection Fraction  60% - 65%  Luis 16  Height  64 inches  5'4"









 Weight  197.00 lb  

 

 Heart Rate  64 /min  

 

 BP Systolic Sitting  148 mmHg  LA, reg

 

 BP Diastolic Sitting  84 mmHg  LA, reg

 

 BMI (Body Mass Index)  33.8 kg/m2  

 

 Ejection Fraction  65%  echo   2016  Height  64 inches  5'4"









 Weight  196.00 lb  w/shoes

 

 Heart Rate  70 /min  

 

 BP Systolic Sitting  162 mmHg  LA reg cuff

 

 BP Diastolic Sitting  90 mmHg  LA reg cuff

 

 BMI (Body Mass Index)  33.6 kg/m2  

 

 Ejection Fraction  60-65%  Echo 2016  Height  64 inches  5'4"









 Weight  196.25 lb  with shoes

 

 Heart Rate  76 /min  

 

 BP Systolic Sitting  132 mmHg  LA, regular cuff

 

 BP Diastolic Sitting  82 mmHg  LA, regular cuff

 

 BMI (Body Mass Index)  33.7 kg/m2  

 

 Ejection Fraction  60-65%  echo 2016  Heart Rate  70 /min  









 BP Systolic  142 mmHg  Man r arm, auto 176/94

 

 BP Diastolic  86 mmHg  Man r arm, auto 176/94

 

 BP Systolic Sitting  136 mmHg  Man L arm, auto 167/92

 

 BP Diastolic Sitting  80 mmHg  Man L arm, auto 167/92

 

 Respiratory Rate  18 /min  









 2016  Height  65 inches  5'5"









 Heart Rate  62 /min  

 

 BP Systolic Sitting  124 mmHg  LA lrg cuff

 

 BP Diastolic Sitting  86 mmHg  LA lrg cuff

 

 BP Systolic Standing  136 mmHg  LA lrg cuff

 

 BP Diastolic Standing  84 mmHg  LA lrg cuff

 

 Respiratory Rate  14 /min  

 

 Ejection Fraction  60-65%  2/22/16









 02/15/2016  Height  65 inches  5'5"









 Weight  196.00 lb  without shoes

 

 Heart Rate  78 /min  

 

 BP Systolic Sitting  140 mmHg  Ra reg cuff

 

 BP Diastolic Sitting  78 mmHg  Ra reg cuff

 

 Respiratory Rate  17 /min  

 

 BMI (Body Mass Index)  32.6 kg/m2  

 

 Ejection Fraction  60-65%  date 1/07/15 ECHO









 2015  Height  65 inches  5'5"









 Weight  184.75 lb  

 

 Heart Rate  58 /min  

 

 BP Systolic Sitting  128 mmHg  Ra, reg

 

 BP Diastolic Sitting  82 mmHg  Ra, reg

 

 BMI (Body Mass Index)  30.7 kg/m2  

 

 Ejection Fraction  60%-65%  1/7/15









 2015  Height  65 inches  5'5"









 Weight  194.25 lb  w/shoes

 

 Heart Rate  66 /min  

 

 BP Systolic Sitting  112 mmHg  Ra reg cuff

 

 BP Diastolic Sitting  76 mmHg  Ra reg cuff

 

 Respiratory Rate  12 /min  

 

 BMI (Body Mass Index)  32.3 kg/m2  









 2014  Height  65 inches  5'5"









 Weight  193.75 lb  

 

 Heart Rate  58 /min  

 

 BP Systolic Sitting  164 mmHg  

 

 BP Diastolic Sitting  88 mmHg  

 

 BMI (Body Mass Index)  32.2 kg/m2  









 2014  Height  65 inches  5'5"









 Weight  193.38 lb  without shoes

 

 Heart Rate  70 /min  

 

 BP Systolic  118 mmHg  L arm , reg cuff

 

 BP Diastolic  70 mmHg  L arm , reg cuff

 

 BP Systolic Sitting  122 mmHg  

 

 BP Diastolic Sitting  70 mmHg  

 

 Respiratory Rate  18 /min  

 

 BMI (Body Mass Index)  32.2 kg/m2  









 10/27/2014  Height  65 inches  5'5"









 Weight  194.00 lb  with shoes

 

 Heart Rate  72 /min  regular

 

 BP Systolic Sitting  118 mmHg  right arm reg cuff

 

 BP Diastolic Sitting  72 mmHg  right arm reg cuff

 

 BP Systolic Standing  120 mmHg  right arm reg cuff

 

 BP Diastolic Standing  78 mmHg  right arm reg cuff

 

 Respiratory Rate  18 /min  

 

 BMI (Body Mass Index)  32.3 kg/m2  









 2014  Weight  196.00 lb  









 Heart Rate  70 /min  

 

 BP Systolic Sitting  140 mmHg  

 

 BP Diastolic Sitting  90 mmHg  









 2014  Heart Rate  68 /min  









 BP Systolic Sitting  130 mmHg  

 

 BP Diastolic Sitting  72 mmHg  









 2014  Height  64 inches  5'4"









 Weight  195.00 lb  

 

 Heart Rate  72 /min  

 

 BP Systolic Sitting  114 mmHg  

 

 BP Diastolic Sitting  66 mmHg  

 

 BMI (Body Mass Index)  33.5 kg/m2  









 2013  Height  64 inches  5'4"









 Weight  192.00 lb  

 

 Heart Rate  64 /min  

 

 BP Systolic Sitting  138 mmHg  

 

 BP Diastolic Sitting  82 mmHg  

 

 Respiratory Rate  16 /min  

 

 BMI (Body Mass Index)  33.0 kg/m2  









 2013  Height  64 inches  5'4"









 Weight  194.25 lb  

 

 Heart Rate  72 /min  Regular

 

 BP Systolic Sitting  116 mmHg  

 

 BP Diastolic Sitting  80 mmHg  

 

 BMI (Body Mass Index)  33.3 kg/m2  









 2012  Height  64 inches  5'4"









 Weight  190.00 lb  

 

 Heart Rate  59 /min  

 

 BP Systolic Standing  128 mmHg  

 

 BP Diastolic Standing  80 mmHg  

 

 BMI (Body Mass Index)  32.6 kg/m2  









 2011  Height  64 inches  5'4"









 Weight  177.00 lb  

 

 Heart Rate  64 /min  

 

 BP Systolic Sitting  150 mmHg  L

 

 BP Diastolic Sitting  82 mmHg  L

 

 BMI (Body Mass Index)  30.4 kg/m2  









 10/06/2010  Height  64 inches  5'4"









 Weight  181.00 lb  

 

 Heart Rate  60 /min  

 

 BP Systolic Sitting  140 mmHg  L

 

 BP Diastolic Sitting  88 mmHg  L

 

 BMI (Body Mass Index)  31.1 kg/m2  









 2010  Height  64 inches  5'4"









 Weight  184.00 lb  

 

 Heart Rate  62 /min  

 

 BP Systolic Sitting  130 mmHg  

 

 BP Diastolic Sitting  70 mmHg  

 

 BMI (Body Mass Index)  31.6 kg/m2  









 2009  Weight  180.00 lb  









 Heart Rate  61 /min  

 

 BP Systolic Sitting  150 mmHg  

 

 BP Diastolic Sitting  80 mmHg  

 

 Respiratory Rate  16 /min  









 2009  Height  64 inches  5'4"









 Weight  190.00 lb  

 

 Heart Rate  53 /min  

 

 BP Systolic Sitting  150 mmHg  L

 

 BP Diastolic Sitting  84 mmHg  L

 

 BMI (Body Mass Index)  32.6 kg/m2  









 2008  Height  64 inches  5'4"









 Weight  186.00 lb  

 

 Heart Rate  60 /min  

 

 BP Systolic Sitting  126 mmHg  

 

 BP Diastolic Sitting  82 mmHg  

 

 Respiratory Rate  14 /min  

 

 BMI (Body Mass Index)  31.9 kg/m2  









 2007  Height  64 inches  5'4"









 Heart Rate  60 /min  

 

 BP Systolic Sitting  124 mmHg  

 

 BP Diastolic Sitting  70 mmHg  









 10/25/2007  Height  64 inches  5'4"









 Weight  190.00 lb  

 

 Heart Rate  70 /min  

 

 BP Systolic Sitting  130 mmHg  

 

 BP Diastolic Sitting  80 mmHg  

 

 Respiratory Rate  16 /min  

 

 O2 % BldC Oximetry  95 %  

 

 BMI (Body Mass Index)  32.6 kg/m2  







Results







 Test  Date  Test  Result  H/L  Range  Note

 

 Lipid Profile (Trig/Chol/HDL)  2018  Triglycerides  231 mg/dL      1









 Cholesterol  126 mg/dL      2

 

 HDL Cholesterol  43.4 mg/dL      3

 

 LDL Cholesterol  36 mg/dL      4









 Comp Metabolic Panel  2018  Sodium  140 mmol/L    133-145  









 Potassium  4.1 mmol/L    3.5-5.0  

 

 Chloride  105 mmol/L    101-111  

 

 Co2 Carbon Dioxide  32 mmol/L    22-32  

 

 Anion Gap  3 mmol/L    2-11  

 

 Glucose  97 mg/dL      

 

 Blood Urea Nitrogen  14 mg/dL    6-24  

 

 Creatinine  0.88 mg/dL    0.67-1.17  

 

 BUN/Creatinine Ratio  15.9    8-20  

 

 Calcium  9.2 mg/dL    8.6-10.3  

 

 Total Protein  6.2 g/dL  Low  6.4-8.9  

 

 Albumin  4.0 g/dL    3.2-5.2  

 

 Globulin  2.2 g/dL    2-4  

 

 Albumin/Globulin Ratio  1.8    1-3  

 

 Total Bilirubin  0.50 mg/dL    0.2-1.0  

 

 Alkaline Phosphatase  60 U/L      

 

 Alt  14 U/L    7-52  

 

 Ast  15 U/L    13-39  

 

 Egfr Non-  84.7    &gt;60  

 

 Egfr   108.9    &gt;60  5









 Lipid Panel - [facility]  2018  Creatine Kinase(CK)  115 U/L      6

 

 Lipid Panel - [facility]  2017  Creatine Kinase(CK)  105 U/L      7

 

 Comp Metabolic Panel  2017  Sodium  138 mmol/L    133-145  









 Potassium  4.1 mmol/L    3.5-5.0  

 

 Chloride  104 mmol/L    101-111  

 

 Co2 Carbon Dioxide  30 mmol/L    22-32  

 

 Anion Gap  4 mmol/L    2-11  

 

 Glucose  98 mg/dL      

 

 Blood Urea Nitrogen  14 mg/dL    6-24  

 

 Creatinine  0.84 mg/dL    0.67-1.17  

 

 BUN/Creatinine Ratio  16.7    8-20  

 

 Calcium  9.1 mg/dL    8.6-10.3  

 

 Total Protein  5.8 g/dL  Low  6.4-8.9  

 

 Albumin  3.8 g/dL    3.2-5.2  

 

 Globulin  2.0 g/dL    2-4  

 

 Albumin/Globulin Ratio  1.9    1-3  

 

 Total Bilirubin  0.50 mg/dL    0.2-1.0  

 

 Alkaline Phosphatase  63 U/L      

 

 Alt  15 U/L    7-52  

 

 Ast  17 U/L    13-39  

 

 Egfr Non-  89.6    &gt;60  

 

 Egfr   115.2    &gt;60  8









 Lipid Profile (Trig/Chol/HDL)  2017  Triglycerides  143 mg/dL      9









 Cholesterol  104 mg/dL      10

 

 HDL Cholesterol  45.4 mg/dL      11

 

 LDL Cholesterol  30 mg/dL      12









 Lipid Panel - [facility]  10/29/2016  Creatine Kinase(CK)  99 U/L      13

 

 Comp Metabolic Panel  10/29/2016  Sodium  141 mmol/L    133-145  









 Potassium  3.8 mmol/L    3.5-5.0  

 

 Chloride  104 mmol/L    101-111  

 

 Co2 Carbon Dioxide  31 mmol/L    22-32  

 

 Anion Gap  6 mmol/L    2-11  

 

 Glucose  98 mg/dL      

 

 Blood Urea Nitrogen  10 mg/dL    6-24  

 

 Creatinine  0.77 mg/dL    0.67-1.17  

 

 BUN/Creatinine Ratio  13.0    8-20  

 

 Calcium  9.5 mg/dL    8.6-10.3  

 

 Total Protein  6.7 g/dL    6.4-8.9  

 

 Albumin  4.3 g/dL    3.2-5.2  

 

 Globulin  2.4 g/dL    2-4  

 

 Albumin/Globulin Ratio  1.8    1-3  

 

 Total Bilirubin  0.60 mg/dL    0.2-1.0  

 

 Alkaline Phosphatase  78 U/L      

 

 Egfr Non-  99.3    &gt;60  

 

 Egfr   127.7    &gt;60  14









 Laboratory test finding  10/29/2016  Alt  14 U/L    7-52  









 Ast  17 U/L    13-39  









 Lipid Profile (Trig/Chol/HDL)  10/29/2016  Triglycerides  192 mg/dL      15









 Cholesterol  137 mg/dL      16

 

 HDL Cholesterol  52.3 mg/dL      17

 

 LDL Cholesterol  46 mg/dL      18









 Lipid Profile (Trig/Chol/HDL)  2016  Triglycerides  172 mg/dL      19









 Cholesterol  282 mg/dL      20

 

 HDL Cholesterol  51.7 mg/dL      21

 

 LDL Cholesterol  196 mg/dL      22









 Comp Metabolic Panel  2016  Sodium  139 mmol/L    133-145  









 Potassium  4.2 mmol/L    3.5-5.0  

 

 Chloride  102 mmol/L    101-111  

 

 Co2 Carbon Dioxide  31 mmol/L    22-32  

 

 Anion Gap  6 mmol/L    2-11  

 

 Glucose  96 mg/dL      

 

 Blood Urea Nitrogen  9 mg/dL    6-24  

 

 Creatinine  0.92 mg/dL    0.67-1.17  

 

 BUN/Creatinine Ratio  9.8    8-20  

 

 Calcium  9.9 mg/dL    8.6-10.3  

 

 Total Protein  6.6 g/dL    6.4-8.9  

 

 Albumin  4.1 g/dL    3.2-5.2  

 

 Globulin  2.5 g/dL    2-4  

 

 Albumin/Globulin Ratio  1.6    1-3  

 

 Total Bilirubin  0.60 mg/dL    0.2-1.0  

 

 Alkaline Phosphatase  77 U/L      

 

 Alt  16 U/L    7-52  

 

 Ast  18 U/L    13-39  

 

 Egfr Non-  80.9    &gt;60  

 

 Egfr   104.0    &gt;60  23









 Lipid Panel - [facility]  2016  Creatine Kinase(CK)  119 U/L      24

 

 Comp Metabolic Panel  2015  Sodium  132 mmol/L  Low  133-145  









 Potassium  3.6 mmol/L    3.5-5.0  

 

 Chloride  98 mmol/L  Low  101-111  

 

 Co2 Carbon Dioxide  29 mmol/L    22-32  

 

 Anion Gap  5 mmol/L    2-11  

 

 Glucose  98 mg/dL      

 

 Blood Urea Nitrogen  15 mg/dL    6-24  

 

 Creatinine  0.80 mg/dL    0.67-1.17  

 

 BUN/Creatinine Ratio  18.8    8-20  

 

 Calcium  9.0 mg/dL    8.6-10.3  

 

 Total Protein  5.7 g/dL  Low  6.4-8.9  

 

 Albumin  3.9 g/dL    3.2-5.2  

 

 Globulin  1.8 g/dL  Low  2-4  

 

 Albumin/Globulin Ratio  2.2    1-3  

 

 Total Bilirubin  0.80 mg/dL    0.2-1.0  

 

 Alkaline Phosphatase  40 U/L      

 

 Alt  16 U/L    7-52  

 

 Ast  16 U/L    13-39  

 

 Egfr Non-  95.3    &gt;60  

 

 Egfr   122.6    &gt;60  25









 Laboratory test finding  2015  Magnesium  2.2 mg/dL    1.9-2.7  

 

 Laboratory test finding  2015  Troponin I  0.00 ng/mL    &lt;0.03  26, 27

 

 Basic Metabolic Panel  10/03/2012  Sodium  140 mmol/L    133-145  









 Potassium  4.0 mmol/L    3.5-5.0  

 

 Chloride  103 mmol/L    101-111  

 

 Co2 Carbon Dioxide  28.0 mmol/L    22-32  

 

 Anion Gap  9.0 mmol/L    2-11  

 

 Glucose  84 mg/dL      

 

 Blood Urea Nitrogen  12 mg/dL    6-24  

 

 Creatinine  1.00 mg/dL    0.50-1.40  

 

 BUN/Creatinine Ratio  12.0    8-20  

 

 Calcium  9.2 mg/dL    8.1-9.9  

 

 Egfr Non-  74.3    &gt;60  

 

 Egfr   95.6    &gt;60  28









 Basic Metabolic Panel  2012  Sodium  137 mmol/L    135-145  









 Potassium  4.1 mmol/L    3.5-5.0  

 

 Chloride  101 mmol/L    101-111  

 

 Co2 (Carbon Dioxide)  33.0 mmol/L  High  22-32  

 

 Anion Gap  3.0 mmol/L    2-11  29

 

 Glucose  111 mg/dL  High    

 

 BUN  12 mg/dL    6-24  

 

 Creatinine  0.8 mg/dL    0.50-1.40  

 

 One Over Creatinine  1.25      

 

 BUN/Creatinine Ratio  15.0    8-20  

 

 Calcium  9.2 mg/dL    8.1-9.9  

 

 eGFR Non-  96.1    &gt; 60  

 

 eGFR   123.6    &gt; 60  30









 Lipid Profile (Trig/Chol/HDL)  2011  Triglyceride  223 mg/dL  High  40-
200  









 Cholesterol  282 mg/dL  High  Less Than 200  31

 

 High Density Lipoprotein  53 mg/dL    40-60  32

 

 Cholesterol/HDL Ratio  5.32 AVERAGE  High  1-4.97  

 

 Low Density Lipoprotein  184 mg/dL  High  Less Than 100  33









 Laboratory test finding  2011  CPK (Creatine Kinase)  213 U/L  High  0-
200  









 Ast (Sgot)  21 U/L    12-42  

 

 Alt (SGPT)  20 U/L    17-63  









 Lipid Profile (Trig/Chol/HDL)  2011  Triglyceride  188 mg/dL      









 Cholesterol  262 mg/dL  High  Less Than 200  34

 

 High Density Lipoprotein  45 mg/dL    40-60  35

 

 Cholesterol/HDL Ratio  5.82 AVERAGE  High  1-4.97  

 

 Low Density Lipoprotein  179 mg/dL  High  Less Than 100  36









 Laboratory test finding  2011  Ast (Sgot)  20 U/L    12-42  









 Alt (SGPT)  18 U/L    17-63  

 

 CPK (Creatine Kinase)  162 U/L    0-200  









 Liver Function Panel  2010  Total Protein  5.8 GM/DL  Low  6.2-8.1  









 Albumin  4.0 GM/DL    3.2-5.2  

 

 Globulin  1.8 GM/DL  Low  2-4  

 

 Albumin/Globulin Ratio  2.2    1-3  

 

 Bilirubin Total  1.3 mg/dL    0.4-1.5  37

 

 Bilirubin Direct  0.2 mg/dL    0.1-0.5  

 

 Indirect Bilirubin  1.1 mg/dL  High  0.3-1.0  38

 

 Alkaline Phosphatase  55 U/L      

 

 Alt (SGPT)  18 U/L    17-63  

 

 Ast (Sgot)  22 U/L    12-42  









 Lipid Profile (Trig/Chol/HDL)  2010  Triglyceride  115 mg/dL      









 Cholesterol  216 mg/dL  High  Less Than 200  39

 

 High Density Lipoprotein  49 mg/dL    40-60  40

 

 Cholesterol/HDL Ratio  4.41 AVERAGE    1-4.97  

 

 Low Density Lipoprotein  144 mg/dL  High  Less Than 100  41









 Laboratory test finding  2010  CPK (Creatine Kinase)  209 U/L  High  0-
200  

 

 Laboratory test finding  2010  CPK (Creatine Kinase)  149 U/L    0-200  

 

 Laboratory test finding  2010  PSA,Diagnostic  1.62 NG/ML    0-4  42

 

 Lipid Profile  2010  Triglyceride  194 mg/dL      



 (Trig/Chol/HDL)            









 Cholesterol  286 mg/dL  High  Less Than 200  43

 

 High Density Lipoprotein  42 mg/dL    40-60  44

 

 Cholesterol/HDL Ratio  6.81 AVERAGE  High  1-4.97  

 

 Low Density Lipoprotein  205 mg/dL  High  Less Than 100  45









 Laboratory test finding  2010  CPK (Creatine Kinase)  192 U/L    0-200  
46

 

 Liver Function Panel  2010  Total Protein  6.0 GM/DL  Low  6.2-8.1  46









 Albumin  3.7 GM/DL    3.2-5.2  46

 

 Globulin  2.3 GM/DL    2-4  46

 

 Albumin/Globulin Ratio  1.6    1-3  46

 

 Bilirubin Total  1.1 mg/dL    0.4-1.5  46, 47

 

 Bilirubin Direct  0.4 mg/dL    0.1-0.5  46

 

 Indirect Bilirubin  0.7 mg/dL    0.1-0.75  46

 

 Alkaline Phosphatase  48 U/L      46

 

 Alt (SGPT)  20 U/L    17-63  46

 

 Ast (Sgot)  30 U/L    12-42  46









 Lipid Profile (Trig/Chol/HDL)  2010  Triglyceride  172 mg/dL      
46









 Cholesterol  237 mg/dL  High  Less Than 200  46, 48

 

 High Density Lipoprotein  40 mg/dL    40-60  46, 49

 

 Cholesterol/HDL Ratio  5.93 AVERAGE  High  1-4.97  46

 

 Low Density Lipoprotein  163 mg/dL  High  Less Than 100  46, 50









 Lipid Profile (Trig/Chol/HDL)  2010  Triglyceride  91 mg/dL      









 Cholesterol  195 mg/dL    Less Than 200  51

 

 High Density Lipoprotein  44 mg/dL    40-60  52

 

 Cholesterol/HDL Ratio  4.30 AVERAGE    1-4.97  

 

 Low Density Lipoprotein  127 mg/dL  High  Less Than 100  53









 Comp Metabolic Panel  2010  Sodium  138 mmol/L    135-145  









 Potassium  4.1 mmol/L    3.5-5.0  

 

 Chloride  103 mmol/L    101-111  

 

 Co2 (Carbon Dioxide)  29.0 mmol/L    22-32  

 

 Anion Gap  6.0 mmol/L    2-11  54

 

 Glucose  88 mg/dL      55

 

 BUN  18 mg/dL    6-24  

 

 Creatinine  0.87 mg/dL    0.50-1.40  

 

 One Over Creatinine  1.10      

 

 BUN/Creatinine Ratio  20.7  High  8-20  

 

 Calcium  9.0 mg/dL    8.1-9.9  56

 

 Total Protein  6.5 GM/DL    6.2-8.1  

 

 Albumin  3.9 GM/DL    3.2-5.2  

 

 Globulin  2.6 GM/DL    2-4  

 

 Albumin/Globulin Ratio  1.5    1-3  

 

 Bilirubin Total  1.3 mg/dL    0.4-1.5  57

 

 Alkaline Phosphatase  46 U/L      

 

 Alt (SGPT)  22 U/L    17-63  

 

 Ast (Sgot)  25 U/L    12-42  

 

 eGFR Non-  93.3    &gt; 60  

 

 eGFR   112.9    &gt; 60  58









 Lipid Panel - [facility]  2010  CPK (Creatine Kinase)  211 U/L  High  0-200  

 

 Laboratory test finding  2009  Glucose  91 mg/dL      46, 59









 BUN  12 mg/dL    6-24  46









 CBC With Electronic Diff  2009  White Blood Count  6.6 CUMM    4.8-10.8  
46









 Red Cell Count  4.94 CUMM    4.6-6.2  46

 

 Hemoglobin  15.5 g/dL    14.0-18.0  46

 

 Hematocrit  45 %    42-52  46

 

 Mean Corpuscular Volume  91 um3    80-94  46

 

 Mean Corpuscular Hemoglob  32 pg  High  27-31  46

 

 Mean Corpuscular HGB Cone  35 g/dL    32-36  46

 

 Redcell Distribution WDTH  13 %    10.5-15  46

 

 Platelet Count  212 CUMM    150-450  46

 

 Mean Platelet Volume  7.7 um3    7.4-10.4  46

 

 Gran %  68.6 %    38-83  46

 

 Lymph %  16.7 %  Low  25-47  46

 

 Mononuclear %  12.1 %  High  1-9  46

 

 Eosinophil %  2.3 %    0-6  46

 

 Basophil %  0.3 %    0-2  46

 

 Abs Lymphs  1.1    1.0-4.8  46

 

 Abs Mononuclear  0.8    0-0.8  46

 

 Absolute Neutrophil Count  4.6    1.5-7.7  46

 

 Abs Eosinophils  0.2    0-0.6  46

 

 Abs Basophils  0    0-0.2  46, 60









 Liver Function Panel  2009  Total Protein  6.0 GM/DL  Low  6.2-8.1  46









 Albumin  3.6 GM/DL    3.2-5.2  46

 

 Globulin  2.4 GM/DL    2-4  46

 

 Albumin/Globulin Ratio  1.5    1-3  46

 

 Bilirubin Total  1.1 mg/dL    0.4-1.5  46, 61

 

 Bilirubin Direct  0.2 mg/dL    0.1-0.5  46

 

 Indirect Bilirubin  0.9 mg/dL  High  0.1-0.75  46

 

 Alkaline Phosphatase  54 U/L      46

 

 Alt (SGPT)  18 U/L    17-63  46

 

 Ast (Sgot)  19 U/L    12-42  46









 Lipid Profile (Trig/Chol/HDL)  2009  Triglyceride  155 mg/dL      
46









 Cholesterol  255 mg/dL  High  Less Than 200  46, 62

 

 High Density Lipoprotein  40 mg/dL    40-60  46, 63

 

 Cholesterol/HDL Ratio  6.38 AVERAGE  High  1-4.97  46

 

 Low Density Lipoprotein  184 mg/dL  High  Less Than 100  46, 64









 Liver Function Panel  2009  Total Protein  5.7 GM/DL  Low  6.2-8.1  









 Albumin  3.6 GM/DL    3.2-5.2  

 

 Globulin  2.1 GM/DL    2-4  

 

 Albumin/Globulin Ratio  1.7    1-3  

 

 Bilirubin Total  1.0 mg/dL    0.4-1.5  65

 

 Bilirubin Direct  0.1 mg/dL    0.1-0.5  

 

 Indirect Bilirubin  0.9 mg/dL  High  0.1-0.75  

 

 Alkaline Phosphatase  45 U/L      

 

 Alt (SGPT)  24 U/L    17-63  

 

 Ast (Sgot)  24 U/L    12-42  









 Lipid Profile (Trig/Chol/HDL)  2009  Triglyceride  117 mg/dL      









 Cholesterol  256 mg/dL  High  Less Than 200  66

 

 High Density Lipoprotein  45 mg/dL    40-60  67

 

 Cholesterol/HDL Ratio  5.69 AVERAGE  High  1-4.97  

 

 Low Density Lipoprotein  188 mg/dL  High  Less Than 100  68









 Basic Metabolic Panel  2009  Sodium  140 mmol/L    135-145  









 Potassium  4.3 mmol/L    3.5-5.0  

 

 Chloride  101 mmol/L    101-111  

 

 Co2 (Carbon Dioxide)  30.0 mmol/L    22-32  

 

 Anion Gap  9.0 mmol/L    2-11  69

 

 Glucose  93 mg/dL      70

 

 BUN  8 mg/dL    6-24  

 

 Creatinine  0.90 mg/dL    0.50-1.40  

 

 One Over Creatinine  1.10      

 

 BUN/Creatinine Ratio  8.9    8-20  

 

 Calcium  9.5 mg/dL    8.1-9.9  71









 Laboratory test finding  2009  CPK (Creatine Kinase)  192 U/L    0-200  

 

 Comp Metabolic Panel  2009  Sodium  139 mmol/L    135-145  









 Potassium  4.0 mmol/L    3.5-5.0  

 

 Chloride  102 mmol/L    101-111  

 

 Co2 (Carbon Dioxide)  30.0 mmol/L    22-32  

 

 Anion Gap  7.0 mmol/L    2-11  72

 

 Glucose  92 mg/dL      73

 

 BUN  12 mg/dL    6-24  

 

 Creatinine  0.90 mg/dL    0.50-1.40  

 

 One Over Creatinine  1.10      

 

 BUN/Creatinine Ratio  13.3    8-20  

 

 Calcium  9.5 mg/dL    8.1-9.9  74

 

 Total Protein  6.7 GM/DL    6.2-8.1  

 

 Albumin  4.0 GM/DL    3.2-5.2  

 

 Globulin  2.7 GM/DL    2-4  

 

 Albumin/Globulin Ratio  1.5    1-3  

 

 Bilirubin Total  1.3 mg/dL    0.4-1.5  

 

 Alkaline Phosphatase  63 U/L      

 

 Alt (SGPT)  24 U/L    17-63  

 

 Ast (Sgot)  25 U/L    12-42  









 Lipid Profile (Trig/Chol/HDL)  2009  Triglyceride  133 mg/dL      









 Cholesterol  203 mg/dL  High  Less Than 200  75

 

 High Density Lipoprotein  52 mg/dL    40-60  76

 

 Cholesterol/HDL Ratio  3.90 AVERAGE    1-4.97  

 

 Low Density Lipoprotein  124 mg/dL  High  Less Than 100  77









 Laboratory test finding  2009  CPK (Creatine Kinase)  333 U/L  High  0-
200  









 PSA,Diagnostic  0.81 NG/ML    0-4  78









 Laboratory test finding  2009  Troponin-I (TnI)  0.05 NG/ML    0-0.06  79

 

 CBC With Electronic Diff Stat  2009  White Blood Count  5.7 CUMM    4.8-
10.8  









 Red Cell Count  4.89 CUMM    4.6-6.2  

 

 Hemoglobin  15.3 g/dL    14.0-18.0  

 

 Hematocrit  44 %    42-52  

 

 Mean Corpuscular Volume  89 um3    80-94  

 

 Mean Corpuscular Hemoglob  31 pg    27-31  

 

 Mean Corpuscular HGB Cone  35 g/dL    32-36  

 

 Redcell Distribution WDTH  13 %    10.5-15  

 

 Platelet Count  245 CUMM    150-450  

 

 Mean Platelet Volume  7.8 um3    7.4-10.4  

 

 Gran %  52.0 %    38-83  

 

 Lymph %  31.7 %    25-47  

 

 Mononuclear %  12.3 %  High  1-9  

 

 Eosinophil %  3.0 %    0-6  

 

 Basophil %  1.0 %    0-2  

 

 Abs Lymphs  1.8    1.0-4.8  

 

 Abs Mononuclear  0.7    0-0.8  

 

 Absolute Neutrophil Count  2.9    1.5-7.7  

 

 Abs Eosinophils  0.2    0-0.6  

 

 Abs Basophils  0.1    0-0.2  









 P33S  2009  Sodium  140 mmol/L    135-145  









 Potassium  3.5 mmol/L    3.5-5.0  

 

 Chloride  102 mmol/L    101-111  

 

 Co2 (Carbon Dioxide)  30.0 mmol/L    22-32  

 

 Anion Gap  8.0 mmol/L    2-11  80

 

 Glucose  85 mg/dL      81

 

 BUN  11 mg/dL    6-24  

 

 Creatinine  0.80 mg/dL    0.50-1.40  

 

 One Over Creatinine  1.20      

 

 BUN/Creatinine Ratio  13.8    8-20  

 

 Calcium  9.3 mg/dL    8.1-9.9  82

 

 Total Protein  6.4 GM/DL    6.2-8.1  

 

 Albumin  3.9 GM/DL    3.2-5.2  

 

 Globulin  2.5 GM/DL    2-4  

 

 Albumin/Globulin Ratio  1.6    1-3  

 

 Bilirubin Total  0.8 mg/dL    0.4-1.5  

 

 Alkaline Phosphatase  55 U/L      

 

 Alt (SGPT)  20 U/L    17-63  

 

 Ast (Sgot)  24 U/L    12-42  









 Laboratory test finding  2009  Troponin-I (TnI)  0 NG/ML    0-0.06  83









 1  Desirable: &lt;150



   Borderline High: 150-199



   High: 200-499



   Very High: &gt;500

 

 2  Desirable: &lt;200



   Borderline High: 200-239



   High: &gt;239

 

 3  Low: &lt;40



   Desirable: 40-60



   High: &gt;60

 

 4  Desirable: &lt;100



   Near Optimal: 100-129



   Borderline High: 130-159



   High: 160-189



   Very High: &gt;189

 

 5  *******Because ethnic data is not always readily available,



   this report includes an eGFR for both -Americans and



   non- Americans.****



   The National Kid[employer] Disease Education Program (NKDEP) does



   not endorse the use of the MDRD equation for patients that



   are not between the ages of 18 and 70, are pregnant, have



   extremes of body size, muscle mass, or nutritional status,



   or are non- or non-.



   According to the [employer], irrespective of



   diagnosis, the stage of the disease is based on the level of



   kidney function:



   Stage Description                      GFR(mL/min/1.73 m(2))



   1     Kidney damage with normal or decreased GFR       90



   2     Kidney damage with mild decrease in GFR          60-89



   3     Moderate decrease in GFR                         30-59



   4     Severe decrease in GFR                           15-29



   5     Kidney failure                       &lt;15 (or dialysis)

 

 6  fasting

 

 7  FASTING



   fasting



   before next ov

 

 8  *******Because ethnic data is not always readily available,



   this report includes an eGFR for both -Americans and



   non- Americans.****



   The National Kid[employer] Disease Education Program (NKDEP) does



   not endorse the use of the MDRD equation for patients that



   are not between the ages of 18 and 70, are pregnant, have



   extremes of body size, muscle mass, or nutritional status,



   or are non- or non-.



   According to the [employer], irrespective of



   diagnosis, the stage of the disease is based on the level of



   kidney function:



   Stage Description                      GFR(mL/min/1.73 m(2))



   1     Kidney damage with normal or decreased GFR       90



   2     Kidney damage with mild decrease in GFR          60-89



   3     Moderate decrease in GFR                         30-59



   4     Severe decrease in GFR                           15-29



   5     Kidney failure                       &lt;15 (or dialysis)

 

 9  Desirable &lt;150



   Borderline high 150-199



   High 200-499



   Very High &gt;500

 

 10  Desirable &lt;200



   Borderline high 200-239



   High &gt;239

 

 11  Low &lt;40



   Desirable: 40-60



   High: &gt;60

 

 12  Desirable: &lt;100 mg/dL



   Near Optimal: 100-129 mg/dL



   Borderline High: 130-159 mg/dL



   High: 160-189 mg/dL



   Very High: &gt;189 mg/dL

 

 13  FASTING



   fasting

 

 14  *******Because ethnic data is not always readily available,



   this report includes an eGFR for both -Americans and



   non- Americans.****



   The National Kid[employer] Disease Education Program (NKDEP) does



   not endorse the use of the MDRD equation for patients that



   are not between the ages of 18 and 70, are pregnant, have



   extremes of body size, muscle mass, or nutritional status,



   or are non- or non-.



   According to the [employer], irrespective of



   diagnosis, the stage of the disease is based on the level of



   kidney function:



   Stage Description                      GFR(mL/min/1.73 m(2))



   1     Kidney damage with normal or decreased GFR       90



   2     Kidney damage with mild decrease in GFR          60-89



   3     Moderate decrease in GFR                         30-59



   4     Severe decrease in GFR                           15-29



   5     Kidney failure                       &lt;15 (or dialysis)

 

 15  Desirable &lt;150



   Borderline high 150-199



   High 200-499



   Very High &gt;500

 

 16  Desirable &lt;200



   Borderline high 200-239



   High &gt;239

 

 17  Low &lt;40



   Desirable: 40-60



   High: &gt;60

 

 18  Desirable: &lt;100 mg/dL



   Near Optimal: 100-129 mg/dL



   Borderline High: 130-159 mg/dL



   High: 160-189 mg/dL



   Very High: &gt;189 mg/dL

 

 19  Desirable &lt;150



   Borderline high 150-199



   High 200-499



   Very High &gt;500

 

 20  Desirable &lt;200



   Borderline high 200-239



   High &gt;239

 

 21  Low &lt;40



   Desirable: 40-60



   High: &gt;60

 

 22  Desirable: &lt;100 mg/dL



   Near Optimal: 100-129 mg/dL



   Borderline High: 130-159 mg/dL



   High: 160-189 mg/dL



   Very High: &gt;189 mg/dL

 

 23  *******Because ethnic data is not always readily available,



   this report includes an eGFR for both -Americans and



   non- Americans.****



   The National Kidney Disease Education Program (NKDEP) does



   not endorse the use of the MDRD equation for patients that



   are not between the ages of 18 and 70, are pregnant, have



   extremes of body size, muscle mass, or nutritional status,



   or are non- or non-.



   According to the National Kidney Foundation, irrespective of



   diagnosis, the stage of the disease is based on the level of



   kidney function:



   Stage Description                      GFR(mL/min/1.73 m(2))



   1     Kidney damage with normal or decreased GFR       90



   2     Kidney damage with mild decrease in GFR          60-89



   3     Moderate decrease in GFR                         30-59



   4     Severe decrease in GFR                           15-29



   5     Kidney failure                       &lt;15 (or dialysis)

 

 24  Fasting- baseline prior to PCSK9

 

 25  *******Because ethnic data is not always readily available,



   this report includes an eGFR for both -Americans and



   non- Americans.****



   The National Kidney Disease Education Program (NKDEP) does



   not endorse the use of the MDRD equation for patients that



   are not between the ages of 18 and 70, are pregnant, have



   extremes of body size, muscle mass, or nutritional status,



   or are non- or non-.



   According to the National Kidney Foundation, irrespective of



   diagnosis, the stage of the disease is based on the level of



   kidney function:



   Stage Description                      GFR(mL/min/1.73 m(2))



   1     Kidney damage with normal or decreased GFR       90



   2     Kidney damage with mild decrease in GFR          60-89



   3     Moderate decrease in GFR                         30-59



   4     Severe decrease in GFR                           15-29



   5     Kidney failure                       &lt;15 (or dialysis)

 

 26  PLEASE HOLD PATINET

 

 27  Reference Range and Interpretation:



   TnI (ng/mL)             Interpretation



   Less Than 0.03 ng/mL    Not supportive of diagnosis of MI



   0.03 - 0.50 ng/mL       Indeterminate: suggest serial



   studies if clinically indicated.



   Greater than 0.5 ng/mL  Consistent with diagnosis of MI

 

 28  *******Because ethnic data is not always readily available,



   this report includes an eGFR for both -Americans and



   non- Americans.****



   The National Kidney Disease Education Program (NKDEP) does



   not endorse the use of the MDRD equation for patients that



   are not between the ages of 18 and 70, are pregnant, have



   extremes of body size, muscle mass, or nutritional status,



   or are non- or non-.



   According to the National Kidney Foundation, irrespective of



   diagnosis, the stage of the disease is based on the level of



   kidney function:



   Stage Description                      GFR(mL/min/1.73 m(2))



   1     Kidney damage with normal or decreased GFR       90



   2     Kidney damage with mild decrease in GFR          60-89



   3     Moderate decrease in GFR                         30-59



   4     Severe decrease in GFR                           15-29



   5     Kidney failure                       &lt;15 (or dialysis)

 

 29  Anion gap measurement may be of limited value in the



   presence of any alkalosis, especially in a combined acid



   base disorder.



   .

 

 30  *******Because ethnic data is not always readily available,



   this report includes an eGFR for both -Americans and



   non- Americans.****



   The National Kidney Disease Education Program (NKDEP) does



   not endorse the use of the MDRD equation for patients that



   are not between the ages of 18 and 70, are pregnant, have



   extremes of body size, muscle mass, or nutritional status,



   or are non- or non-.



   According to the National Kidney Foundation, irrespective of



   diagnosis, the stage of the disease is based on the level of



   kidney function:



   Stage Description                      GFR(mL/min/1.73 m(2))



   1     Kidney damage with normal or decreased GFR       90



   2     Kidney damage with mild decrease in GFR          60-89



   3     Moderate decrease in GFR                         30-59



   4     Severe decrease in GFR                           15-29



   5     Kidney failure                       &lt;15 (or dialysis)

 

 31  CHOLESTEROL INTERPRETATION:



   Desirable:  Less than 200 MG/DL



   Borderline-High Risk:  200-239 MG/DL



   High-Risk:  240 MG/DL and over

 

 32  HDL INTERPRETATION:



   Undesirable: High Risk:  Less than 40 MG/DL



   Desirable:  Low Risk:  Greater than 60 MG/DL

 

 33  LDL INTERPRETATION:



   Low Risk Optimal Level:  LDL Less than 100 MG/DL



   Near or Above Optimal:  -129 MG/DL



   Borderline High Risk:  -159 MG/DL



   High Risk:  -189 MG/DL



   Very High Risk:  LDL Greater than 189 MG/DL

 

 34  CHOLESTEROL INTERPRETATION:



   Desirable:  Less than 200 MG/DL



   Borderline-High Risk:  200-239 MG/DL



   High-Risk:  240 MG/DL and over

 

 35  HDL INTERPRETATION:



   Undesirable: High Risk:  Less than 40 MG/DL



   Desirable:  Low Risk:  Greater than 60 MG/DL

 

 36  LDL INTERPRETATION:



   Low Risk Optimal Level:  LDL Less than 100 MG/DL



   Near or Above Optimal:  -129 MG/DL



   Borderline High Risk:  -159 MG/DL



   High Risk:  -189 MG/DL



   Very High Risk:  LDL Greater than 189 MG/DL

 

 37  A metabolite of Naproxen, O-desmethylnaproxen, has been



   shown to interfere with the Jendrassik-Nadeem method for



   measuring total bilirubin.  Samples from patients who have



   taken Naproxen have shown spurious elevation in total



   bilirubin levels.

 

 38  **Please note updated reference range, effective 7/22/10**

 

 39  CHOLESTEROL INTERPRETATION:



   Desirable:  Less than 200 MG/DL



   Borderline-High Risk:  200-239 MG/DL



   High-Risk:  240 MG/DL and over

 

 40  HDL INTERPRETATION:



   Undesirable: High Risk:  Less than 40 MG/DL



   Desirable:  Low Risk:  Greater than 60 MG/DL

 

 41  LDL INTERPRETATION:



   Low Risk Optimal Level:  LDL Less than 100 MG/DL



   Near or Above Optimal:  -129 MG/DL



   Borderline High Risk:  -159 MG/DL



   High Risk:  -189 MG/DL



   Very High Risk:  LDL Greater than 189 MG/DL

 

 42  *



   SERUM LEVELS OF PSA MEASURED USING THE [employer]



   ACCESS HYBRITECH IMMUNOASSAY SHOULD NOT BE INTERPRETED AS



   ABSOLUTE EVIDENCE OF THE PRESENCE OR ABSENCE OF DISEASE.



   THE PSA VALUE SHOULD BE USED IN CONJUNCTION WITH OTHER



   PERTINENT CLINICAL DIAGNOSTIC PROCEDURES.

 

 43  CHOLESTEROL INTERPRETATION:



   Desirable:  Less than 200 MG/DL



   Borderline-High Risk:  200-239 MG/DL



   High-Risk:  240 MG/DL and over

 

 44  HDL INTERPRETATION:



   Undesirable: High Risk:  Less than 40 MG/DL



   Desirable:  Low Risk:  Greater than 60 MG/DL

 

 45  LDL INTERPRETATION:



   Low Risk Optimal Level:  LDL Less than 100 MG/DL



   Near or Above Optimal:  -129 MG/DL



   Borderline High Risk:  -159 MG/DL



   High Risk:  -189 MG/DL



   Very High Risk:  LDL Greater than 189 MG/DL

 

 46  FASTING

 

 47  A metabolite of Naproxen, O-desmethylnaproxen, has been



   shown to interfere with the Jendrassik-Bridger method for



   measuring total bilirubin.  Samples from patients who have



   taken Naproxen have shown spurious elevation in total



   bilirubin levels.

 

 48  CHOLESTEROL INTERPRETATION:



   Desirable:  Less than 200 MG/DL



   Borderline-High Risk:  200-239 MG/DL



   High-Risk:  240 MG/DL and over

 

 49  HDL INTERPRETATION:



   Undesirable: High Risk:  Less than 40 MG/DL



   Desirable:  Low Risk:  Greater than 60 MG/DL

 

 50  LDL INTERPRETATION:



   Low Risk Optimal Level:  LDL Less than 100 MG/DL



   Near or Above Optimal:  -129 MG/DL



   Borderline High Risk:  -159 MG/DL



   High Risk:  -189 MG/DL



   Very High Risk:  LDL Greater than 189 MG/DL

 

 51  CHOLESTEROL INTERPRETATION:



   Desirable:  Less than 200 MG/DL



   Borderline-High Risk:  200-239 MG/DL



   High-Risk:  240 MG/DL and over

 

 52  HDL INTERPRETATION:



   Undesirable: High Risk:  Less than 40 MG/DL



   Desirable:  Low Risk:  Greater than 60 MG/DL

 

 53  LDL INTERPRETATION:



   Low Risk Optimal Level:  LDL Less than 100 MG/DL



   Near or Above Optimal:  -129 MG/DL



   Borderline High Risk:  -159 MG/DL



   High Risk:  -189 MG/DL



   Very High Risk:  LDL Greater than 189 MG/DL

 

 54  Anion gap measurement may be of limited value in the



   presence of any alkalosis, especially in a combined acid



   base disorder.



   .

 

 55  ** Note change in reference range as of 08.  The



   change was based on recommendations from the American



   Diabetes Association.

 

 56  Please note change in reference range effective 08



   



   



   .

 

 57  A metabolite of Naproxen, O-desmethylnaproxen, has been



   shown to interfere with the Jendrassik-Bridger method for



   measuring total bilirubin.  Samples from patients who have



   taken Naproxen have shown spurious elevation in total



   bilirubin levels.

 

 58  *******Because ethnic data is not always readily available,



   this report includes an eGFR for both -Americans and



   non- Americans.****



   The National Kidney Disease Education Program (NKDEP) does



   not endorse the use of the MDRD equation for patients that



   are not between the ages of 18 and 70, are pregnant, have



   extremes of body size, muscle mass, or nutritional status,



   or are non- or non-.



   According to the National Kidney Foundation, irrespective of



   diagnosis, the stage of the disease is based on the level of



   kidney function:



   Stage Description                      GFR(mL/min/1.73 m(2))



   1     Kidney damage with normal or decreased GFR       90



   2     Kidney damage with mild decrease in GFR          60-89



   3     Moderate decrease in GFR                         30-59



   4     Severe decrease in GFR                           15-29



   5     Kidney failure                       &lt;15 (or dialysis)

 

 59  ** Note change in reference range as of 08.  The



   change was based on recommendations from the American



   Diabetes Association.

 

 60  Lymphopenia %

 

 61  A metabolite of Naproxen, O-desmethylnaproxen, has been



   shown to interfere with the Jendrassik-Nadeem method for



   measuring total bilirubin.  Samples from patients who have



   taken Naproxen have shown spurious elevation in total



   bilirubin levels.

 

 62  CHOLESTEROL INTERPRETATION:



   Desirable:  Less than 200 MG/DL



   Borderline-High Risk:  200-239 MG/DL



   High-Risk:  240 MG/DL and over

 

 63  HDL INTERPRETATION:



   Undesirable: High Risk:  Less than 40 MG/DL



   Desirable:  Low Risk:  Greater than 60 MG/DL

 

 64  LDL INTERPRETATION:



   Low Risk Optimal Level:  LDL Less than 100 MG/DL



   Near or Above Optimal:  -129 MG/DL



   Borderline High Risk:  -159 MG/DL



   High Risk:  -189 MG/DL



   Very High Risk:  LDL Greater than 189 MG/DL

 

 65  A metabolite of Naproxen, O-desmethylnaproxen, has been



   shown to interfere with the Jendrassik-Bridger method for



   measuring total bilirubin.  Samples from patients who have



   taken Naproxen have shown spurious elevation in total



   bilirubin levels.

 

 66  CHOLESTEROL INTERPRETATION:



   Desirable:  Less than 200 MG/DL



   Borderline-High Risk:  200-239 MG/DL



   High-Risk:  240 MG/DL and over

 

 67  HDL INTERPRETATION:



   Undesirable: High Risk:  Less than 40 MG/DL



   Desirable:  Low Risk:  Greater than 60 MG/DL

 

 68  LDL INTERPRETATION:



   Low Risk Optimal Level:  LDL Less than 100 MG/DL



   Near or Above Optimal:  -129 MG/DL



   Borderline High Risk:  -159 MG/DL



   High Risk:  -189 MG/DL



   Very High Risk:  LDL Greater than 189 MG/DL

 

 69  Anion gap measurement may be of limited value in the



   presence of any alkalosis, especially in a combined acid



   base disorder.



   .

 

 70  ** Note change in reference range as of 08.  The



   change was based on recommendations from the American



   Diabetes Association.

 

 71  Please note change in reference range effective 08



   



   



   .

 

 72  Anion gap measurement may be of limited value in the



   presence of any alkalosis, especially in a combined acid



   base disorder.



   .

 

 73  ** Note change in reference range as of 08.  The



   change was based on recommendations from the American



   Diabetes Association.

 

 74  Please note change in reference range effective 08



   



   



   .

 

 75  CHOLESTEROL INTERPRETATION:



   Desirable:  Less than 200 MG/DL



   Borderline-High Risk:  200-239 MG/DL



   High-Risk:  240 MG/DL and over

 

 76  HDL INTERPRETATION:



   Undesirable: High Risk:  Less than 40 MG/DL



   Desirable:  Low Risk:  Greater than 60 MG/DL

 

 77  LDL INTERPRETATION:



   Low Risk Optimal Level:  LDL Less than 100 MG/DL



   Near or Above Optimal:  -129 MG/DL



   Borderline High Risk:  -159 MG/DL



   High Risk:  -189 MG/DL



   Very High Risk:  LDL Greater than 189 MG/DL

 

 78  *



   SERUM LEVELS OF PSA MEASURED USING THE [employer]



   ACCESS HYBRITECH IMMUNOASSAY SHOULD NOT BE INTERPRETED AS



   ABSOLUTE EVIDENCE OF THE PRESENCE OR ABSENCE OF DISEASE.



   THE PSA VALUE SHOULD BE USED IN CONJUNCTION WITH OTHER



   PERTINENT CLINICAL DIAGNOSTIC PROCEDURES.

 

 79  New Reference Range and Interpretation effective 10/4/02



   



   TnI (ng/ml)             INTERPRETATION



   



   &lt;0.06 ng/ml             NOT SUPPORTIVE OF DIAGNOSIS OF MI



   



   0.06 - 0.50 ng/ml       INDETERMINATE: SUGGEST SERIAL



   STUDIES IF CLINICALLY INDICATED.



   



   &gt; 0.5 ng/ml             CONSISTENT WITH DIAGNOSIS OF MI



   .

 

 80  Anion gap measurement may be of limited value in the



   presence of any alkalosis, especially in a combined acid



   base disorder.



   .

 

 81  ** Note change in reference range as of 08.  The



   change was based on recommendations from the American



   Diabetes Association.

 

 82  Please note change in reference range effective 08



   



   



   .

 

 83  New Reference Range and Interpretation effective 10/4/02



   



   TnI (ng/ml)             INTERPRETATION



   



   &lt;0.06 ng/ml             NOT SUPPORTIVE OF DIAGNOSIS OF MI



   



   0.06 - 0.50 ng/ml       INDETERMINATE: SUGGEST SERIAL



   STUDIES IF CLINICALLY INDICATED.



   



   &gt; 0.5 ng/ml             CONSISTENT WITH DIAGNOSIS OF MI



   .







Procedures







 Date  CPT Code  Description  Status

 

 2018  00060  EKG Tracing &amp; Interpretation  Completed

 

 02/15/2018  50755  Interrogation Device Eval,Implantable Loop Recorder  
Completed



     System  

 

 02/15/2018  32778  Interrogation Device Eval,Implantable Loop Recorder  
Completed



     System  

 

 2017  59496  Interrogation Device Eval,Implantable Loop Recorder  
Completed



     System  

 

 10/20/2017  19323  Anoscopy  Completed

 

 2017  89770  Interrogation Device Eval,Implantable Loop Recorder  
Completed



     System  

 

 2017  85635  EKG Tracing &amp; Interpretation  Completed

 

 2017  69111  Implantable Cardio System Loop Recorder Sys Remota Data  
Completed



     Acquistio  

 

 2017  91241  Interrogation Dev Loop Recorder Incl Physician  Completed



     Analysis,Rev,Repor  

 

 2017  32728  Implantable Cardio System Loop Recorder Sys Remota Data  
Completed



     Acquistio  

 

 2017  17423  Interrogation Dev Loop Recorder Incl Physician  Completed



     Analysis,Rev,Repor  

 

 2017  76552  Implantable Cardio System Loop Recorder Sys Remota Data  
Completed



     Acquistio  

 

 2017  87390  Interrogation Dev Loop Recorder Incl Physician  Completed



     Analysis,Rev,Repor  

 

 2017  61059  Inject/Drain Joint/Bursa Small  Completed

 

 2017  59334  Implant Cardiac Loop Recorder  Completed

 

 2017  04860  Stress Test Supervsn W/Out I/R  Completed

 

 2017  52441  Treadmill Interp/Report Only  Completed

 

 2017  95746  Short Arm Cast Application  Completed

 

 2016  48123  Mobile Cardiovascular Telemetry Over 24 HR Up To 30  
Completed



     Days  

 

 2016  98612  Color Flow Doppler/Interp &amp; Reprt  Completed

 

 2016  53899  Pulse Wave/Continuous-Interp.RPT  Completed

 

 2016  65183  Echocardiography, Transesophageal, Real Time W/Image 2D  
Completed



     W/W/O M-M  

 

 2016  84185  ECHO Transthorasic Realtime 2D W Doppler &amp; Color  
Completed



     Flow Hosp  

 

 2016  07237  EKG Tracing &amp; Interpretation  Completed

 

 2016  91473  ECHO Transthoracic, Real-Time 2D With Doppler And Color  
Completed



     Flow  

 

 2016  51480  Stress Test  Completed

 

 2016  38543  Myocardial Perfusion Imaging Tomographic (Spect)  Completed



     Multiple Studies  

 

 2015  06494  EKG Tracing &amp; Interpretation  Completed

 

 2015  23444  ECHO Transthoracic, Real-Time 2D With Doppler And Color  
Completed



     Flow  

 

 10/31/2014  09803  Stress Test  Completed

 

 10/31/2014  27792  Myocardial Perfusion Imaging Tomographic (Spect)  Completed



     Multiple Studies  

 

 10/31/2014  47148  Myocardial Perfusion Imaging Tomographic (Spect)  Completed



     Multiple Studies  

 

 10/27/2014  53404  EKG Tracing &amp; Interpretation  Completed

 

 2014  91932  ECHO Transthoracic, Real-Time 2D With Doppler And Color  
Completed



     Flow  

 

 2014  64326  EKG Tracing &amp; Interpretation  Completed

 

 2013  71431  EKG Tracing &amp; Interpretation  Completed

 

 2013  98206  EKG Tracing &amp; Interpretation  Completed

 

 2012  53671  EKG Tracing &amp; Interpretation  Completed

 

 2011  71208  EKG Tracing &amp; Interpretation  Completed

 

 10/04/2010  03988  ECHO Transthoracic, Real-Time 2D With Doppler And Color  
Completed



     Flow  

 

 2010  24554  ECHO Stress Test Incl Perf Contiuous ekg Monitoring  
Completed



     W/Phys Superv  

 

 2009  57620  EKG Tracing &amp; Interpretation  Completed

 

 2009  33265  ECHO Stress Test Incl Perf Contiuous ekg Monitoring  
Completed



     W/Phys Superv  

 

 2009  15420  ECHO/Stress  Completed

 

 2009  28606  Stress Test  Completed

 

 2009  47212  EKG Tracing &amp; Interpretation  Completed

 

 2008  18362  Echocardiogram  Completed

 

 2008  41649  Echocardiogram  Completed

 

 2008  92608  Pulse Doppler &amp; Continuous Wave  Completed

 

 2008  45649  Pulse Doppler &amp; Continuous Wave  Completed

 

 2008  46057  Color Doppler  Completed

 

 2008  18275  Color Doppler  Completed

 

 2008  32384  Color Doppler  Completed

 

 2008  65368  EKG Tracing &amp; Interpretation  Completed

 

 2008  78749  EKG Tracing &amp; Interpretation  Completed

 

 10/31/2007  08886  Left Heart Catheterization  Completed

 

 10/31/2007  40439  Inj Proc LFT Vent/LFT Atrl Angio  Completed

 

 10/31/2007  93806  Inj Proc LFT Vent/LFT Atrl Angio  Completed

 

 10/31/2007  18046  Coronary Angiography  Completed

 

 10/31/2007  62462  S/I/R Inj Proc Vent And Or Atrial  Completed

 

 10/31/2007  11748  S/I/R Inj Proc Vent And Or Atrial  Completed

 

 10/31/2007  56998  Selective Coronary Angioplasty  Completed

 

 10/25/2007  14574  EKG Tracing &amp; Interpretation  Completed

 

 10/25/2007  96228  EKG Tracing &amp; Interpretation  Completed

 

 2007  66249  ECHO/Stress  Completed

 

 2007  54579  Stress Test  Completed

 

 2007  97210  Stress Test  Completed







Encounters







 Type  Date  Location  Provider  CPT E/M  Dx

 

 Office Visit  2018  3:30p  [facility]  [facility]  
[id number]  I10



     Cma      









 E78.5

 

 I35.0

 

 R94.31

 

 R07.9









 Office Visit  04/10/2018 10:40a  [facility]  [place]. [place]hay[place],  
[address]  I10



     Shira ALONSO    









 Z86.73

 

 E78.5

 

 R21









 Office Visit  2018  3:00p  [facility]  [place] 
[facility]  [address]  I10









 E78.5

 

 I35.0

 

 R94.31









 Office Visit  2018  3:00p  [facility]  [place], 
[facility]  [address]  I10









 E78.5

 

 I35.0

 

 R94.31

 

 R07.9









 Office Visit  2017  3:20p  [facility]  [place] S. [place]hay[place],  
[address]  I35.0



       M.DMiriam    









 I10

 

 E78.5

 

 R94.31









 Office Visit  2017  2:15p  [facility]  Darrius Zepeda MD  
03224  M18.12



     Of C.M.A.      

 

 Office Visit  2017  1:20p  [facility]  Qutaybeh S.  [address]  Z95.818



       CELESTE Smith    









 I35.0

 

 I10

 

 Z86.73

 

 E78.5









 Office Visit  2017  3:45p  [facility]  Darrius Zepeda MD  
94683  M18.12



     C.M.A.      

 

 Office Visit  2017  7:18a  [facility],  Teddy Pederson M.D.  
71491  R07.89



     [facility]ospital[facility]      









 E78.5

 

 I10









 Office Visit  2017  2:15p  [facility]  Darrius Zepeda MD  
21764  M19.032



     C.M.A.      

 

 Office Visit  02/10/2017  1:45p  [facility]  Stiven Prajapati,  
47484  I10



     [facility]  M.D.    









 I47.1

 

 I47.2









 Office Visit  2017  2:45p  [facility]  Darrius Zepeda MD  
88246  M19.032



     C.M.A.      

 

 Office Visit  2017  3:00p  [facility]  SATYA Marquez  [address]  
G45.9



     [facility]      









 I10

 

 I35.0

 

 I47.1

 

 I47.2









 Office Visit  12/15/2016 11:30a  [facility]  SATYA Marquez  [address]  
G45.9









 I10

 

 E78.5

 

 I35.0

 

 I47.1









 Office Visit  2016  3:20p  [facility]  Qutaybeh S. Maghaydah,  
[address]458  G45.9



       M.D.    









 I10

 

 E78.5

 

 I77.810









 Office Visit  2016  4:21p  [facility],  Darrius Sheridan,  
04694  G45.9



     Hospitalists  M.D.    









 I10

 

 E78.5









 Office Visit  2016  1:45p  [facility]  Delroy Lambert MD  
09995  G45.9









 I10









 Office Visit  2016  4:19p  [facility],  Teddy Pederson M.D.  
23797  G45.9



     [facility]ospitalists      









 I10

 

 E78.5









 Office Visit  2016 11:00a  [facility]  Qutalalit S. Sarah,  
67446  I10



       M.D.    









 E78.5

 

 I77.810

 

 I35.0









 Office Visit  2016  2:00p  [facility]  Qutaybeh S. Sarah,  
53430  I10



       M.D.    









 E78.5









 Office Visit  2016  3:30p  [facility]  Nurse Visit IC  
[id number]  I10

 

 Office Visit  2016  3:30p  [facility]  SATYA Marquez  
26451  I10









 E78.5

 

 K21.9

 

 I77.810









 Office Visit  02/15/2016  3:30p  [facility]  SATYA Marquez  
85659  I10









 E78.5

 

 R07.9

 

 Z86.73









 Office Visit  2015  4:00p  [facility]  Qutaybeh SMiriam Smith,  
10511  401.1



       M.D.    









 272.4

 

 447.71

 

 V12.54









 Office Visit  2015  9:30a  [facility]  SATYA Marquez  [id number]  
401.1









 272.4

 

 447.71









 Office Visit  2014 11:00a  [facility]  Qutaybeh SMiriam Smith,  
80982  272.4



       M.D.    









 401.1

 

 786.50

 

 447.71









 Office Visit  2014  3:00p  [facility]  SATYA Marquez  
51115  272.4









 401.1

 

 794.31









 Office Visit  10/27/2014  3:30p  [facility]  SATYA Marquez  
[id number]  786.50









 272.4

 

 401.1









 Office Visit  2014  2:30p  [facility]  SATYA Marquez  [id number]  
272.4









 401.1

 

 794.31









 Office Visit  2014  3:00p  [facility]  SATYA Marquez  [id number]  
272.4









 785.2

 

 780.79









 Office Visit  2014  3:40p  [facility]  Qutaybeh S. Maghaydah,  
59052  401.1



       M.D.    









 272.4









 Office Visit  2013 10:00a  [facility]  Qutaybeh S. Maghaydah,  
05343  401.1



       M.D.    









 272.4

 

 794.31









 Office Visit  2013 11:00a  [facility]  Qutaybeh S. Maghaydah,  
10170  401.1



       M.D.    









 272.4

 

 794.31









 Office Visit  2012  3:30p  [facility]  Qutaybeh S. Maghaydah,  
29812  401.1



       M.D.    









 272.4









 Office Visit  2011  1:40p  [facility]  Qutaybeh S. Maghaydah,  
94321  401.1



       M.D.    









 272.4

 

 794.31









 Office Visit  10/06/2010  8:40a  [facility]  Qutaybeh S. Maghaydah,  
55283  401.1



       M.D.    









 272.4









 Office Visit  2010  9:20a  [facility]  Qutaybeh S. Maghaydah,  
30883  272.4



       M.D.    









 401.1









 Office Visit  2009  3:20p  [facility]  Qutaybeh S. Maghaydah,  
54547  272.4



       M.D.    









 401.0









 Office Visit  2009  4:00p  [facility]  Qutaybeh S. Maghaydah,  
76904  272.4



       M.D.    









 794.31

 

 401.0









 Office Visit  2008  3:20p  [facility]  Qutaybeh S. Maghaydah,  
36978  401.1



       M.D.    









 794.31

 

 272.4









 Office Visit  2007  2:00p  [facility] Qutaybeh S. Maghaydah,  
58481  272.4



       M.D.    









 401.1

 

 786.50









 Office Visit  10/25/2007  2:20p  [facility] Qutaybeh S. Maghaydah,  
00928  786.50



       M.D.    









 401.1

 

 272.4

 

 794.31









 Office Visit  2007 10:30a  [facility] Qutaybeh S. Maghaydah,  
11953  786.50



       M.D.    









 401.1

 

 780.4

 

 272.4







Plan of Care

Future Appointment(s):2018  2:40 pm - Pacemaker Schedule at [facility]2018 - Sun Mendieta, N.P.I10 Essential (primary) 
hypertensionComments:BP high today 150/80Follow up:[facility] [place] 2018Recommendations
:Continue to check your blood pressure daily and call if SBP consistently 
greater than 150.  Continuemoderate exercise for 30-40 min/dayE78.5 
Hyperlipidemia, nwrszqwnoehY04.0 Nonrheumatic aortic (valve) wgijredxD39.31 
Abnormal electrocardiogram [ECG] [EKG]R07.9 Chest pain, unspecified

## 2018-05-04 NOTE — RAD
INDICATION: Hypertension     



COMPARISON: February 17, 2017

 

TECHNIQUE: An AP portable view obtained at 0814 hours is submitted.



FINDINGS: 



Bones/Soft Tissues: There are no acute bony findings.    



Cardiomediastinal: The cardiomediastinal silhouette is normal. 



Lungs: There are no infiltrates.



Pleura: There are no pleural effusions. 



Other: None



IMPRESSION:  NORMAL CHEST.

## 2018-05-04 NOTE — XMS REPORT
Mario Omer

 Created on:April 10, 2018



Patient:Mario Omer

Sex:Male

:1945

External Reference #:[id number]





Demographics







 Address  [address]



   [place] [address]

 

 Home Phone  [phone]

 

 Mobile Phone  [phone]

 

 Email Address  [email or web address]

 

 Preferred Language  English

 

 Marital Status  Not  Or 

 

 Sabianism Affiliation  Unknown

 

 Race  White

 

 Ethnic Group  Not  Or 









Author







 Organization  [facility]

 

 Address  [address] [address]

 

 Phone  [phone]









Support







 Name  Relationship  Address  Phone

 

 Monse Herrera  Unavailable  Unavailable  [phone]









Care Team Providers







 Name  Role  Phone

 

 Hal Hinojosa MD  Primary Care Physician  Unavailable









Payers







 Type  Date  Identification Numbers  Payment Provider  Subscriber

 

 Medicare Primary  Effective:  Policy Number:  Medicare  Mario Omer



   2008  [id number]    









 PayID: [id number]  [address]









 [place] [address]









 Medigap Part B    Policy Number: [id number]  [facility]  ([place])  Mario Omer









 PayID:   [address] [address]









 [place] [address]









 Medigap Part B  Expires: 10/31/2008  Policy Number:  [employer]  Mario Omer



     [id number]    









 Group Number: [id number]  [address]

 

 PayID: [id number]  [place] [address]







Problems







 Date  Description  Provider  Status

 

 Onset: 2012  Benign essential hypertension  Qutaybeh S. Maghaydah, Active M.D.  

 

 Onset: 2012  Hyperlipidemia  Qutaybeh S. Maghaydah, Active M.D.  

 

 Onset: 2013  Electrocardiogram abnormal  Qutaybeh S. Maghaydah, Active M.D.  

 

 Onset: 2014  Heart murmur  SATYA Marquez  Active

 

 Onset: 2014  Malaise and fatigue  SATYA Marquez  Active

 

 Onset: 02/15/2016  Essential hypertension  SATYA Marquez  Active

 

 Onset: 2017  Localized, primary osteoarthritis  Darrius Zepeda MD  
Active



   of the hand    







Social History







 Type  Date  Description  Comments

 

 Marital Status      

 

 Lives With    Wife  

 

 Occupation    Retired  

 

 ETOH Use    Liquor  2 ounces daily

 

 Smoking    Patient has never smoked  

 

 Recreational Drug Use    Denies Drug Use  

 

 Daily Caffeine    Comsumes on average 1 cup of decaff  



     coffee per day  

 

 Exercise Type/Frequency    Exercises regularly  







Allergies, Adverse Reactions, Alerts







 Date  Description  Reaction  Status  Severity  Comments

 

 2009  Zocor    active    skin rash

 

 06/15/2009  Gemfibrozil    active    rash and itching

 

 06/15/2009  Niaspan    active    rash and itching

 

 2011  Pravastatin  rash, rash  active    

 

 2014  Fenofibrate  fatigue, headaches  active    

 

 2014  Lipitor  rash  active    

 

 2014  Cholestyramine  mouth sores,  active    



     constipation      

 

 2015  Flomax    active    

 

 10/25/2007  NKDA    inactive    

 

 06/15/2009  Zetia    inactive    rash and itching







Medications







 Medication  Date  Status  Form  Strength  Qnty  SIG  Indications  Ordering



                 Provider

 

 Amlodipine    Active  Tablets  5mg  30tab  1 tab by  I10  Sun DAMON



 Besylate          s  mouth every    Foster,



             day    N.P.

 

 Praluent  10/15  Active  Solution  75mg/ml  4ml  1 injection    Qutaybeh /2017    Pen-Inject      sc every 2    S.



             weeks    CELESTE Smith

 

 Triamterene/Hyd    Active  Capsules  37.5-25mg  90cap  1 PO qd    Qutayb



 rochlorothiaz        s      MARISOL Smith M.D.

 

 Metoprolol    Active  Tablets ER  25mg  90tab  1 by mouth    Qutaybeh



 Succinate ER      24HR    s  every day    MARISOL Smith M.D.

 

 Klor-Con M20    Active  Tablets ER  20Meq  180ta  1 by mouth    Qutayb        bs  twice a day    MARISOL Smith M.D.

 

 Aspirin Ec    Active  Tablets DR  81mg  90tab  1 tablet po    Qutayb        s  daily    MARISOL Smith M.D.

 

 Lisinopril    Active  Tablets  20mg  180ta  2 by mouth    Qutayb        bs  every day    MARISOL Smith M.D.

 

 Albuterol    Active  Aerosol  90mcg/Act  1unit  one puff am,    Qutaybeh /2009        s  one puff pm    S.



             as needed    CELESTE Smith

 

 Fluticasone  00  Active  Suspension  50mcg/Act  1unit  1 spray am,    
Unknown



 Propionate          s  1 spray pm    



             as needed    

 

 Multivitamins  00  Active  Tablets    90tab  1 po once a    Unknown



   /0000        s  week    

 

 Verapamil HCL    Active  Tablets ER  240mg  135ta  take one tab    
Qutaybeh



 ER  /0000        bs  in the in    S.



             the morning    Sarah



             and 1/2 tab    MICHELLEDMiriam



             in the at    



             night    

 

 Calcium &amp;    Active  Liquid  1200mg    1 by mouth    Unknown



 Vitamin D3            once a week    

 

 Acetaminophen    Active  Tablets  325mg    2 tablets by    Unknown



   /          mouth every    



             6 hours as    



             needed for    



             pain/fever    

 

 Flaxseed Oil    Active    1000mg    twice a day    Unknown



                 

 

 Stool Softener    Active  Capsules  100mg    take 1 tab 4    Unknown



   /0000           times a    



             week    

 

 Magnesium    Active  Tablets  200mg    1 by mouth    Unknown



   /          once a week    



             with calcium    

 

 Glucosamine    Active  Capsules  500/400    1 tab    Unknown



 Chondroitin            alternating    



             with    



             calcium/magn    



             esium    

 

 Melatonin    Active  Tablets  3mg    once at    Unknown



   /0000          night    

 

 Azelastine HCL    Active  Solution  137mcg/Sp    1 squirt    Unknown



 (Nasal)        ray    each nostril    



             once a day    



             as needed    

 

 Omeprazole    Active  Capsules DR  20mg    1 by mouth    Unknown



   /          every day as    



             needed    

 

 Triamcinolone    Active  Cream  0.1%    Apply    Unknown



 Acetonide            Topically To    



             Affected    



             Area(S) Two    



             Times Daily    



             -- Avoid    



             Face And (on    



             hold    



             04/10/18)    

 

                 

 

 Repatha  02/15  Hx  Solution  140mg/ml    inject once  E78.5  Qutaybeh



 Sureclick      Auto-Inject    s  every 2    S.



   -          weeks    Sarah



   10/18              , M.D.



                 

 

 Cholestyramine    Hx  Packet  4gm  180pa  1 packet    Irene



           cks  once a day    Joshua Vail M.D.



   10/01              



   /2014              

 

 Welchol    Hx  Packet  3.75gm  30uni  Mix 1 packet    Markietayb        ts  with water    S.



   -          as directed    Sarah



             with the    , M.D.



   /2014          largest meal    



             of the day    

 

 Fenofibrate    Hx  Tablets  145mg  30tab  take 1 tab  272.4  yb        s  po QHS    S.



   -              Maghaydah



                 , M.D.



                 

 

 Potassium    Hx  Tablets ER  20Meq  30tab  1 po qd    Qutaybeh



 Chloride          s      S.



   -              Maghaydah



                 , M.D.



   /              

 

 Flonase    Hx  Suspension  50mcg/Act  1unit  2 intranasal    Qutayb        s  puffs to    S.



   -          each nostril    Maghaydah



             daily prn    , M.D.



   /              

 

 Zestril    Hx  Tablets  20mg    2 po qd    Qutaybeh



                 S.



   -              Maghaydah



                 , M.D.



   /              

 

 Pravastatin    Hx  Tablets  10mg    1 po qd    Qutaybeh



 Sodium                S.



   -              Maghaydah



                 , M.D.



                 

 

 Pravastatin    Hx  Tablets  10mg  45tab  one half po    Qutaybeh



 Sodium          s  qd    S.



   -              Maghaydah



                 , M.D.



                 

 

 Pravastatin    Hx  Tablets  10mg  30tab  1 po qhs    Qutaybeh



 Sodium          s      S.



   -              Maghaydah



                 , M.D.



                 

 

 Flax Seed Oil    Hx  Capsules  1200mg    1 po qd    Qutaybeh



                 S.



   -              Maghaydah



                 , M.D.



                 

 

 Simvastatin    Hx  Tablets  10mg  30tab  1 po hs    Qutaybeh



           s      S.



   -              Maghaydah



                 , M.D.



                 

 

 Fish Oil    Hx  Capsules  1200mg    1 po qd    Qutaybeh



                 S.



   -              Maghaydah



                 , M.D.



                 

 

 Simvastatin    Hx  Tablets  20mg  30tab  1 po qd    Qutaybeh



           s      S.



   -              Maghaydah



                 , M.D.



   /              

 

 Micardis    Hx  Tablets  80mg  90tab  1 po qd    Qutaybeh



           s      S.



   -              Maghaydah



                 , M.D.



   /              

 

 Micardis    Hx  Tablets  80mg  90tab  1 po qd Id #    Qutaybeh /2010        s  [id number]    S.



   -              Maghaydah



                 , M.D.



                 

 

 Micardis    Hx  Tablets  80mg    1 po qd    Qutayb              S.



   -              [facility]



                 , [facility]



                 

 

 Simvastatin    Hx  Tablets  10mg  90tab  1 po qhs    Johnny        s      ESEQUIEL Larose,



                 M.D.



                 

 

 Diovan    Hx  Tablets  80mg    1 po qd    Qutayb              S.



   -              [facility]



                 , M.[facility]



                 

 

 Diovan    Hx  Tablets  80mg  90tab  1 po qd    Qutayb        s      S.



   -              [facility]



                 , M.D.



                 

 

 Diovan    Hx  Tablets  80mg    po qam    tayb              S.



   [facility]



                 , [facility]



                 

 

 Simvastatin  10/30  Hx  Tablets  40mg  30tab  1 po qhs    Qutayb        s      S.



   -              Pacoydah



                 , M.D.



                 

 

 Benicar    Hx  Tablets  40mg  30tab  1 po qd    Qutayb        s      S.



   -              [facility]



                 , [facility]



                 

 

 Crestor    Hx  Tablets  5mg  30tab  1 po qd    Qutayb        s      S.



   -              [facility]



   10/30              , M.D.



                 

 

 Lovaza    Hx  Capsules  1gm  60cap  1 po bid    Qutayb        s      S.



   -              [facility]



                 , M.D.



                 

 

 Gemfibrozil    Hx  Tablets  600mg  120ta  1 po bid    Qutayb        bs      S.



   -              [facility]



   06/15              , M.D.



                 

 

 OTC Fish Oil    Hx    1Gram    one po bid    Qutayb              S.



   -              [facility]



                 , M.D.



                 

 

 Lovaza    Hx  Capsules  1gm  180ca  1 po bid    Qutayb        ps      S.



   -              [facility]



                 , M.[facility].



                 

 

 Niaspan    Hx  Tablets ER  500mg  30tab  1tab pm    Qutayb        s      S.



   -              Maghaydah



                 , M.D.



   /              

 

 Atacand    Hx  Tablets  16mg  90tab  1 po qd    Qutaybeh



           s      S.



   -              Maghaydah



                 , M.D.



   /              

 

 Atacand    Hx  Tablets  32  30tab  1 qam    Qutayb        s      S.



   -              Maghaydah



                 , M.D.



   /              

 

 Zetia    Hx  Tablets  10mg  30tab  1 po qd    Qutaybeh



           s      S.



   -              Maghaydah



                 , M.D.



   /              

 

 K-Dur    Hx  Tablets ER  20Meq  90tab  1 po qd    Qutaybeh



           s      S.



   -              Maghaydah



                 , M.D.



   /              

 

 Zocor    Hx  Tablets  20mg  90tab  One QHS    Qutayb        s      S.



   -              Maghaydah



                 , M.D.



   /              

 

 Zestril  1025  Hx  Tablets  20mg  90tab  1 PO bid    Qutaybeh



           s      S.



   -              Maghaydah



                 , M.D.



   /              

 

 Calan SR  1025  Hx  Tablets ER  240mg  30tab  1 PO qd    Qutaybeh



           s      S.



   -              Maghaydah



                 , M.D.



   /              

 

 Triamterene/Hyd  1025  Hx  Capsules  37.5-25  90cap  1 PO qd    Qutaybeh



 rochlorothiazid          s      S.



 e  -              Maghaydah



                 , M.D.



   /              

 

 Klonopin  10/25  Hx  Tablets  1mg    1 PO qd    Qutaybeh



                 S.



   -              Maghaydah



                 , M.D.



   /              

 

 Allegra  10/25  Hx  Tablets    90tab  1 PO qd    Qutaybeh



           s      S.



   -              Maghaydah



                 , M.D.



   /              

 

 Singulair  10/25  Hx  Tablets  10mg  90tab  1 PO qd    Qutaybeh



           s      S.



   -              Maghaydah



                 , M.D.



   /              

 

 Nasonex  10/25  Hx  Suspension  50mcg/Act  3unit  2 Squirts    Qutaybeh



           s  Intranasal    S.



   -          qd    Maghaydramon



                 , M.CHANELL



   /              

 

 Mucinex  10/25  Hx  Tablets ER  600mg  90tab  1 PO bid    Qutayb



   /    12HR    s      S.



   -              Sarah



                 , M.CHANELL



   /              

 

 Benicar  00/00  Hx  Tablets  20mg  90tab  1 po qd    Unknown



   /0000        s      



   -              



                 

 

 Verapamil HCL  00/00  Hx  Tablets ER  240mg  90tab  1 po qd    Unknown



 CR  /        s      



   -              



                 

 

 Fish Oil  00/  Hx  Capsules  1200mg    2 po qd    Unknown



   /              



   -              



                 

 

 Klonopin  /  Hx  Tablets  0.5mg    tid    Unknown



   /              



   -              



                 

 

 Benadryl  00/  Hx  Capsules  25mg  30cap  1 po at hs    Unknown



   /        s  prn    



   -              



                 

 

 Zetia  /  Hx  Tablets  10mg  90tab  1 po qd - pt    Unknown



   /        s  will be    



   -          dc'ing after    



             rx runs out    



   /          after    



             14    

 

 Loratadine  00/  Hx  Tablets  10mg  30tab  1 po qd prn    Unknown



   /        s      



   -              



   10/20              



   /2014              

 

 Calcium  00/00  Hx    100    one tablet    Unknown



   /0000          once a week    



   -              



                 

 

 B Complex  00/  Hx  Capsules      1 po once a    Unknown



   /0000          week    



   -          (random)    



                 

 

 Magnesium  00/  Hx  Tablets  200mg    one po once    Unknown



 Citrate  /0000          a week    



   -              



                 

 

 Clonazepam  00/  Hx  Tablets  0.5mg    1/2 tab in    Unknown



   /0000          Am and 1 tab    



   -          at night    



                 

 

 Omeprazole  /  Hx  Capsules DR  20mg  90cap  1 po qd    Unknown



   /0000        s      



   -              



                 

 

 Verapamil HCL  00/00  Hx  Tablets ER  240mg  30tab  1 po qd    Unknown



 SA  /0000        s      



   -              



                 

 

 Klor-Con  00/00  Hx  Tablets ER    30tab  1 po qd    Unknown



   /        s      



   -              



                 

 

 Guaifenesin  00/  Hx  Tablets  400mg    bid    Unknown



   /              



   -              



   10/20              



   /2014              

 

 Losartan  00  Hx  Tablets  50mg  180ta  1 by mouth    Qutayblalit



 Potassium  /0000        bs  once a day    S.



   -              Sarah



                 , M.D.



   /              

 

 Flomax  00  Hx  Capsules  0.4mg  90cap  1 by mouth    Unknown



   /0000        s  every day    



   -              



                 

 

 Jublia    Hx  Solution  10%  4ml  apply 1 drop    Unknown



   /0000          to the nail,    



   -          spread it    



             around the    



   /2016          nail, every    



             day for 48    



             weeks. use 2    



             drops on the    



             big toe    

 

 Zestril    Hx    20mg    2 tabs daily    Unknown



   /0000              



   -              



                 

 

 Azelastine HCL    Hx  Solution  0.15%    use one    Unknown



 (Nasal)            spray in    



   -          each nostril    



             twice daily    



             prn    

 

 Pyridium    Hx  Tablets  100mg    one by mouth    Unknown



   /0000          three times    



   -          prn    



                 

 

 Oxybutynin    Hx  Tablets  5mg    1 by mouth    Unknown



 Chloride  0000          tid (during    



   -          tx) ( on    



             Hold)    



                 

 

 Rapaflo    Hx  Capsules  8mg    1 by mouth    Unknown



   /0000          every day    



   -              



                 

 

 Ibuprofen    Hx    200mg    1 po q 8h    Unknown



   /0000          prn    



   -              



                 

 

 Lupron  00  Hx            Unknown



 Injection  /0000              



   -              



                 

 

 Lupron  00/00  Hx        every 6    Unknown



 Injection  /0000          months    



   -              



                 

 

 Co Q 10    Hx  Capsules  100mg    1 by mouth    Unknown



   /0000          every day    



   -              



                 

 

 Miralax    Hx  Powder  3350NF    17 gm every    Unknown



   /0000          day mixed w/    



   -          8 oz    



             water/juice    



             as needed    







Medications Administered in Office







 Medication  Date  Status  Form  Strength  Qnty  SIG  Indications  Ordering



                 Provider

 

 Celestone 3 mg    Administered  Injection          Darrius



 and 3mg  017              MD Duane

 

 Technetium TC    Administered  Injection          Nathan DAMON



 99M  016              DO Valentin



 Tetrofosmin,                FACC



 Per Unit Dose                



 Up To 40                



 Millicuries                

 

 Technetium TC  10/31/2  Administered  Injection          Stiven LUA



 99M  014              CELESTE Prajapati



 Tetrofosmin,                



 Per Unit Dose                



 Up To 40                



 Millicuries                

 

 Technetium TC  10/31/2  Administered  Injection          Nishi Serrano



 99M  014              PA



 Tetrofosmin,                



 Per Unit Dose                



 Up To 40                



 Millicuries                







Vital Signs







 Date  Vital  Result  Comment

 

 04/10/2018  Height  64 inches  5'4"









 Weight  198.00 lb  with shoes

 

 Heart Rate  56 /min  

 

 BP Systolic  140 mmHg  Home unit HR 55

 

 BP Diastolic  84 mmHg  Home unit HR 55

 

 BP Systolic Sitting  134 mmHg  Lue lrg cuff

 

 BP Diastolic Sitting  92 mmHg  Lue lrg cuff

 

 Respiratory Rate  16 /min  

 

 BMI (Body Mass Index)  34.0 kg/m2  

 

 Ejection Fraction  65%  2016-echo









 2018  Height  64 inches  5'4"









 Weight  199.00 lb  No shoes

 

 Heart Rate  62 /min  

 

 BP Systolic Sitting  125 mmHg  Lue lrg cuff

 

 BP Diastolic Sitting  64 mmHg  Lue lrg cuff

 

 Respiratory Rate  17 /min  

 

 BMI (Body Mass Index)  34.2 kg/m2  

 

 Ejection Fraction  60-65%  2016-echo









 2018  Height  54 inches  4'6"









 Weight  199.31 lb  No shoes

 

 Heart Rate  82 /min  

 

 BP Systolic Sitting  130 mmHg  rue reg cuff

 

 BP Diastolic Sitting  80 mmHg  rue reg cuff

 

 BP Systolic Standing  150 mmHg  rue reg cuf

 

 BP Diastolic Standing  70 mmHg  rue reg cuf

 

 Respiratory Rate  17 /min  

 

 BMI (Body Mass Index)  48.1 kg/m2  

 

 Ejection Fraction  65%  2016-Echo









 10/20/2017  Heart Rate  78 /min  









 Respiratory Rate  16 /min  

 

 Body Temperature  96.8 F  









 2017  Height  65 inches  5'5"









 Weight  193.25 lb  with shoes

 

 Heart Rate  66 /min  

 

 BP Systolic Sitting  136 mmHg  LA reg cuff

 

 BP Diastolic Sitting  72 mmHg  LA reg cuff

 

 BMI (Body Mass Index)  32.2 kg/m2  

 

 Ejection Fraction  60% - 65%  Luis 16  Height  65 inches  5'5"









 Weight  194.00 lb  

 

 BP Systolic  118 mmHg  

 

 BP Diastolic  72 mmHg  

 

 Respiratory Rate  16 /min  

 

 Body Temperature  96.5 F  

 

 Pain Level  2  

 

 BMI (Body Mass Index)  32.3 kg/m2  









 2017  Height  65 inches  5'5"









 Weight  191.75 lb  with shoes

 

 Heart Rate  66 /min  

 

 BP Systolic Sitting  124 mmHg  LA lrg cuff

 

 BP Diastolic Sitting  72 mmHg  LA lrg cuff

 

 BMI (Body Mass Index)  31.9 kg/m2  

 

 Ejection Fraction  60%-65%  Luis 16  Height  65 inches  5'5"









 Weight  196.00 lb  

 

 Heart Rate  78 /min  

 

 BP Systolic  120 mmHg  

 

 BP Diastolic  64 mmHg  

 

 Respiratory Rate  14 /min  

 

 Body Temperature  96.4 F  

 

 Pain Level  1  

 

 BMI (Body Mass Index)  32.6 kg/m2  









 2017  Height  65 inches  5'5"









 Weight  196.00 lb  w/ shoes

 

 Heart Rate  66 /min  reg

 

 BP Systolic Sitting  116 mmHg  Rue, reg cuff

 

 BP Diastolic Sitting  80 mmHg  Rue, reg cuff

 

 BP Systolic Standing  120 mmHg  Rue

 

 BP Diastolic Standing  80 mmHg  Rue

 

 Respiratory Rate  16 /min  

 

 BMI (Body Mass Index)  32.6 kg/m2  

 

 Ejection Fraction  &gt; 65%  as of 16 echo









 2017  Height  65 inches  5'5"









 Weight  195.00 lb  

 

 Heart Rate  64 /min  

 

 Respiratory Rate  16 /min  

 

 Pain Level  0  

 

 BMI (Body Mass Index)  32.4 kg/m2  









 02/10/2017  Height  65 inches  5'5"









 Weight  195.00 lb  

 

 Heart Rate  60 /min  

 

 BP Systolic Sitting  122 mmHg  Rue large cuff

 

 BP Diastolic Sitting  74 mmHg  Rue large cuff

 

 BP Systolic Standing  116 mmHg  

 

 BP Diastolic Standing  72 mmHg  

 

 Respiratory Rate  16 /min  

 

 BMI (Body Mass Index)  32.4 kg/m2  

 

 Ejection Fraction  65%  2017  Height  65 inches  5'5"









 Weight  195.00 lb  

 

 Heart Rate  60 /min  

 

 Respiratory Rate  16 /min  

 

 Pain Level  4  

 

 BMI (Body Mass Index)  32.4 kg/m2  









 2017  Height  64 inches  5'4"









 Weight  200.00 lb  with shoes

 

 Heart Rate  66 /min  

 

 BP Systolic Sitting  146 mmHg  Ra lrg cuff

 

 BP Diastolic Sitting  74 mmHg  Ra lrg cuff

 

 BMI (Body Mass Index)  34.3 kg/m2  

 

 Ejection Fraction  &gt;65%  echo 11/06/16









 12/15/2016  Height  64 inches  5'4"









 Weight  201.00 lb  with boots

 

 Heart Rate  74 /min  

 

 BP Systolic  162 mmHg  LA lrg cuff

 

 BP Diastolic  96 mmHg  LA lrg cuff

 

 BMI (Body Mass Index)  34.5 kg/m2  

 

 Ejection Fraction  60% - 65%  Luis 16  Height  64 inches  5'4"









 Weight  197.00 lb  

 

 Heart Rate  64 /min  

 

 BP Systolic Sitting  148 mmHg  LA, reg

 

 BP Diastolic Sitting  84 mmHg  LA, reg

 

 BMI (Body Mass Index)  33.8 kg/m2  

 

 Ejection Fraction  65%  echo   2016  Height  64 inches  5'4"









 Weight  196.00 lb  w/shoes

 

 Heart Rate  70 /min  

 

 BP Systolic Sitting  162 mmHg  LA reg cuff

 

 BP Diastolic Sitting  90 mmHg  LA reg cuff

 

 BMI (Body Mass Index)  33.6 kg/m2  

 

 Ejection Fraction  60-65%  Echo 2016  Height  64 inches  5'4"









 Weight  196.25 lb  with shoes

 

 Heart Rate  76 /min  

 

 BP Systolic Sitting  132 mmHg  LA, regular cuff

 

 BP Diastolic Sitting  82 mmHg  LA, regular cuff

 

 BMI (Body Mass Index)  33.7 kg/m2  

 

 Ejection Fraction  60-65%  echo 2016  Heart Rate  70 /min  









 BP Systolic  142 mmHg  Man r arm, auto 176/94

 

 BP Diastolic  86 mmHg  Man r arm, auto 176/94

 

 BP Systolic Sitting  136 mmHg  Man L arm, auto 167/92

 

 BP Diastolic Sitting  80 mmHg  Man L arm, auto 167/92

 

 Respiratory Rate  18 /min  









 2016  Height  65 inches  5'5"









 Heart Rate  62 /min  

 

 BP Systolic Sitting  124 mmHg  LA lrg cuff

 

 BP Diastolic Sitting  86 mmHg  LA lrg cuff

 

 BP Systolic Standing  136 mmHg  LA lrg cuff

 

 BP Diastolic Standing  84 mmHg  LA lrg cuff

 

 Respiratory Rate  14 /min  

 

 Ejection Fraction  60-65%  2/22/16









 02/15/2016  Height  65 inches  5'5"









 Weight  196.00 lb  without shoes

 

 Heart Rate  78 /min  

 

 BP Systolic Sitting  140 mmHg  Ra reg cuff

 

 BP Diastolic Sitting  78 mmHg  Ra reg cuff

 

 Respiratory Rate  17 /min  

 

 BMI (Body Mass Index)  32.6 kg/m2  

 

 Ejection Fraction  60-65%  date 1/07/15 ECHO









 2015  Height  65 inches  5'5"









 Weight  184.75 lb  

 

 Heart Rate  58 /min  

 

 BP Systolic Sitting  128 mmHg  Ra, reg

 

 BP Diastolic Sitting  82 mmHg  Ra, reg

 

 BMI (Body Mass Index)  30.7 kg/m2  

 

 Ejection Fraction  60%-65%  1/7/15









 2015  Height  65 inches  5'5"









 Weight  194.25 lb  w/shoes

 

 Heart Rate  66 /min  

 

 BP Systolic Sitting  112 mmHg  Ra reg cuff

 

 BP Diastolic Sitting  76 mmHg  Ra reg cuff

 

 Respiratory Rate  12 /min  

 

 BMI (Body Mass Index)  32.3 kg/m2  









 2014  Height  65 inches  5'5"









 Weight  193.75 lb  

 

 Heart Rate  58 /min  

 

 BP Systolic Sitting  164 mmHg  

 

 BP Diastolic Sitting  88 mmHg  

 

 BMI (Body Mass Index)  32.2 kg/m2  









 2014  Height  65 inches  5'5"









 Weight  193.38 lb  without shoes

 

 Heart Rate  70 /min  

 

 BP Systolic  118 mmHg  L arm , reg cuff

 

 BP Diastolic  70 mmHg  L arm , reg cuff

 

 BP Systolic Sitting  122 mmHg  

 

 BP Diastolic Sitting  70 mmHg  

 

 Respiratory Rate  18 /min  

 

 BMI (Body Mass Index)  32.2 kg/m2  









 10/27/2014  Height  65 inches  5'5"









 Weight  194.00 lb  with shoes

 

 Heart Rate  72 /min  regular

 

 BP Systolic Sitting  118 mmHg  right arm reg cuff

 

 BP Diastolic Sitting  72 mmHg  right arm reg cuff

 

 BP Systolic Standing  120 mmHg  right arm reg cuff

 

 BP Diastolic Standing  78 mmHg  right arm reg cuff

 

 Respiratory Rate  18 /min  

 

 BMI (Body Mass Index)  32.3 kg/m2  









 2014  Weight  196.00 lb  









 Heart Rate  70 /min  

 

 BP Systolic Sitting  140 mmHg  

 

 BP Diastolic Sitting  90 mmHg  









 2014  Heart Rate  68 /min  









 BP Systolic Sitting  130 mmHg  

 

 BP Diastolic Sitting  72 mmHg  









 2014  Height  64 inches  5'4"









 Weight  195.00 lb  

 

 Heart Rate  72 /min  

 

 BP Systolic Sitting  114 mmHg  

 

 BP Diastolic Sitting  66 mmHg  

 

 BMI (Body Mass Index)  33.5 kg/m2  









 2013  Height  64 inches  5'4"









 Weight  192.00 lb  

 

 Heart Rate  64 /min  

 

 BP Systolic Sitting  138 mmHg  

 

 BP Diastolic Sitting  82 mmHg  

 

 Respiratory Rate  16 /min  

 

 BMI (Body Mass Index)  33.0 kg/m2  









 2013  Height  64 inches  5'4"









 Weight  194.25 lb  

 

 Heart Rate  72 /min  Regular

 

 BP Systolic Sitting  116 mmHg  

 

 BP Diastolic Sitting  80 mmHg  

 

 BMI (Body Mass Index)  33.3 kg/m2  









 2012  Height  64 inches  5'4"









 Weight  190.00 lb  

 

 Heart Rate  59 /min  

 

 BP Systolic Standing  128 mmHg  

 

 BP Diastolic Standing  80 mmHg  

 

 BMI (Body Mass Index)  32.6 kg/m2  









 2011  Height  64 inches  5'4"









 Weight  177.00 lb  

 

 Heart Rate  64 /min  

 

 BP Systolic Sitting  150 mmHg  L

 

 BP Diastolic Sitting  82 mmHg  L

 

 BMI (Body Mass Index)  30.4 kg/m2  









 10/06/2010  Height  64 inches  5'4"









 Weight  181.00 lb  

 

 Heart Rate  60 /min  

 

 BP Systolic Sitting  140 mmHg  L

 

 BP Diastolic Sitting  88 mmHg  L

 

 BMI (Body Mass Index)  31.1 kg/m2  









 2010  Height  64 inches  5'4"









 Weight  184.00 lb  

 

 Heart Rate  62 /min  

 

 BP Systolic Sitting  130 mmHg  

 

 BP Diastolic Sitting  70 mmHg  

 

 BMI (Body Mass Index)  31.6 kg/m2  









 2009  Weight  180.00 lb  









 Heart Rate  61 /min  

 

 BP Systolic Sitting  150 mmHg  

 

 BP Diastolic Sitting  80 mmHg  

 

 Respiratory Rate  16 /min  









 2009  Height  64 inches  5'4"









 Weight  190.00 lb  

 

 Heart Rate  53 /min  

 

 BP Systolic Sitting  150 mmHg  L

 

 BP Diastolic Sitting  84 mmHg  L

 

 BMI (Body Mass Index)  32.6 kg/m2  









 2008  Height  64 inches  5'4"









 Weight  186.00 lb  

 

 Heart Rate  60 /min  

 

 BP Systolic Sitting  126 mmHg  

 

 BP Diastolic Sitting  82 mmHg  

 

 Respiratory Rate  14 /min  

 

 BMI (Body Mass Index)  31.9 kg/m2  









 2007  Height  64 inches  5'4"









 Heart Rate  60 /min  

 

 BP Systolic Sitting  124 mmHg  

 

 BP Diastolic Sitting  70 mmHg  









 10/25/2007  Height  64 inches  5'4"









 Weight  190.00 lb  

 

 Heart Rate  70 /min  

 

 BP Systolic Sitting  130 mmHg  

 

 BP Diastolic Sitting  80 mmHg  

 

 Respiratory Rate  16 /min  

 

 O2 % BldC Oximetry  95 %  

 

 BMI (Body Mass Index)  32.6 kg/m2  







Results







 Test  Date  Test  Result  H/L  Range  Note

 

 Lipid Profile (Trig/Chol/HDL)  2018  Triglycerides  231 mg/dL      1









 Cholesterol  126 mg/dL      2

 

 HDL Cholesterol  43.4 mg/dL      3

 

 LDL Cholesterol  36 mg/dL      4









 Comp Metabolic Panel  2018  Sodium  140 mmol/L    133-145  









 Potassium  4.1 mmol/L    3.5-5.0  

 

 Chloride  105 mmol/L    101-111  

 

 Co2 Carbon Dioxide  32 mmol/L    22-32  

 

 Anion Gap  3 mmol/L    2-11  

 

 Glucose  97 mg/dL      

 

 Blood Urea Nitrogen  14 mg/dL    6-24  

 

 Creatinine  0.88 mg/dL    0.67-1.17  

 

 BUN/Creatinine Ratio  15.9    8-20  

 

 Calcium  9.2 mg/dL    8.6-10.3  

 

 Total Protein  6.2 g/dL  Low  6.4-8.9  

 

 Albumin  4.0 g/dL    3.2-5.2  

 

 Globulin  2.2 g/dL    2-4  

 

 Albumin/Globulin Ratio  1.8    1-3  

 

 Total Bilirubin  0.50 mg/dL    0.2-1.0  

 

 Alkaline Phosphatase  60 U/L      

 

 Alt  14 U/L    7-52  

 

 Ast  15 U/L    13-39  

 

 Egfr Non-  84.7    &gt;60  

 

 Egfr   108.9    &gt;60  5









 Lipid Panel - [facility]  2018  Creatine Kinase(CK)  115 U/L      6

 

 Lipid Panel - [facility]  2017  Creatine Kinase(CK)  105 U/L      7

 

 Comp Metabolic Panel  2017  Sodium  138 mmol/L    133-145  









 Potassium  4.1 mmol/L    3.5-5.0  

 

 Chloride  104 mmol/L    101-111  

 

 Co2 Carbon Dioxide  30 mmol/L    22-32  

 

 Anion Gap  4 mmol/L    2-11  

 

 Glucose  98 mg/dL      

 

 Blood Urea Nitrogen  14 mg/dL    6-24  

 

 Creatinine  0.84 mg/dL    0.67-1.17  

 

 BUN/Creatinine Ratio  16.7    8-20  

 

 Calcium  9.1 mg/dL    8.6-10.3  

 

 Total Protein  5.8 g/dL  Low  6.4-8.9  

 

 Albumin  3.8 g/dL    3.2-5.2  

 

 Globulin  2.0 g/dL    2-4  

 

 Albumin/Globulin Ratio  1.9    1-3  

 

 Total Bilirubin  0.50 mg/dL    0.2-1.0  

 

 Alkaline Phosphatase  63 U/L      

 

 Alt  15 U/L    7-52  

 

 Ast  17 U/L    13-39  

 

 Egfr Non-  89.6    &gt;60  

 

 Egfr   115.2    &gt;60  8









 Lipid Profile (Trig/Chol/HDL)  2017  Triglycerides  143 mg/dL      9









 Cholesterol  104 mg/dL      10

 

 HDL Cholesterol  45.4 mg/dL      11

 

 LDL Cholesterol  30 mg/dL      12









 Lipid Panel - [facility]  10/29/2016  Creatine Kinase(CK)  99 U/L      13

 

 Comp Metabolic Panel  10/29/2016  Sodium  141 mmol/L    133-145  









 Potassium  3.8 mmol/L    3.5-5.0  

 

 Chloride  104 mmol/L    101-111  

 

 Co2 Carbon Dioxide  31 mmol/L    22-32  

 

 Anion Gap  6 mmol/L    2-11  

 

 Glucose  98 mg/dL      

 

 Blood Urea Nitrogen  10 mg/dL    6-24  

 

 Creatinine  0.77 mg/dL    0.67-1.17  

 

 BUN/Creatinine Ratio  13.0    8-20  

 

 Calcium  9.5 mg/dL    8.6-10.3  

 

 Total Protein  6.7 g/dL    6.4-8.9  

 

 Albumin  4.3 g/dL    3.2-5.2  

 

 Globulin  2.4 g/dL    2-4  

 

 Albumin/Globulin Ratio  1.8    1-3  

 

 Total Bilirubin  0.60 mg/dL    0.2-1.0  

 

 Alkaline Phosphatase  78 U/L      

 

 Egfr Non-  99.3    &gt;60  

 

 Egfr   127.7    &gt;60  14









 Laboratory test finding  10/29/2016  Alt  14 U/L    7-52  









 Ast  17 U/L    13-39  









 Lipid Profile (Trig/Chol/HDL)  10/29/2016  Triglycerides  192 mg/dL      15









 Cholesterol  137 mg/dL      16

 

 HDL Cholesterol  52.3 mg/dL      17

 

 LDL Cholesterol  46 mg/dL      18









 Lipid Profile (Trig/Chol/HDL)  2016  Triglycerides  172 mg/dL      19









 Cholesterol  282 mg/dL      20

 

 HDL Cholesterol  51.7 mg/dL      21

 

 LDL Cholesterol  196 mg/dL      22









 Comp Metabolic Panel  2016  Sodium  139 mmol/L    133-145  









 Potassium  4.2 mmol/L    3.5-5.0  

 

 Chloride  102 mmol/L    101-111  

 

 Co2 Carbon Dioxide  31 mmol/L    22-32  

 

 Anion Gap  6 mmol/L    2-11  

 

 Glucose  96 mg/dL      

 

 Blood Urea Nitrogen  9 mg/dL    6-24  

 

 Creatinine  0.92 mg/dL    0.67-1.17  

 

 BUN/Creatinine Ratio  9.8    8-20  

 

 Calcium  9.9 mg/dL    8.6-10.3  

 

 Total Protein  6.6 g/dL    6.4-8.9  

 

 Albumin  4.1 g/dL    3.2-5.2  

 

 Globulin  2.5 g/dL    2-4  

 

 Albumin/Globulin Ratio  1.6    1-3  

 

 Total Bilirubin  0.60 mg/dL    0.2-1.0  

 

 Alkaline Phosphatase  77 U/L      

 

 Alt  16 U/L    7-52  

 

 Ast  18 U/L    13-39  

 

 Egfr Non-  80.9    &gt;60  

 

 Egfr   104.0    &gt;60  23









 Lipid Panel - [facility]  2016  Creatine Kinase(CK)  119 U/L      24

 

 Comp Metabolic Panel  2015  Sodium  132 mmol/L  Low  133-145  









 Potassium  3.6 mmol/L    3.5-5.0  

 

 Chloride  98 mmol/L  Low  101-111  

 

 Co2 Carbon Dioxide  29 mmol/L    22-32  

 

 Anion Gap  5 mmol/L    2-11  

 

 Glucose  98 mg/dL      

 

 Blood Urea Nitrogen  15 mg/dL    6-24  

 

 Creatinine  0.80 mg/dL    0.67-1.17  

 

 BUN/Creatinine Ratio  18.8    8-20  

 

 Calcium  9.0 mg/dL    8.6-10.3  

 

 Total Protein  5.7 g/dL  Low  6.4-8.9  

 

 Albumin  3.9 g/dL    3.2-5.2  

 

 Globulin  1.8 g/dL  Low  2-4  

 

 Albumin/Globulin Ratio  2.2    1-3  

 

 Total Bilirubin  0.80 mg/dL    0.2-1.0  

 

 Alkaline Phosphatase  40 U/L      

 

 Alt  16 U/L    7-52  

 

 Ast  16 U/L    13-39  

 

 Egfr Non-  95.3    &gt;60  

 

 Egfr   122.6    &gt;60  25









 Laboratory test finding  2015  Magnesium  2.2 mg/dL    1.9-2.7  

 

 Laboratory test finding  2015  Troponin I  0.00 ng/mL    &lt;0.03  26, 27

 

 Basic Metabolic Panel  10/03/2012  Sodium  140 mmol/L    133-145  









 Potassium  4.0 mmol/L    3.5-5.0  

 

 Chloride  103 mmol/L    101-111  

 

 Co2 Carbon Dioxide  28.0 mmol/L    22-32  

 

 Anion Gap  9.0 mmol/L    2-11  

 

 Glucose  84 mg/dL      

 

 Blood Urea Nitrogen  12 mg/dL    6-24  

 

 Creatinine  1.00 mg/dL    0.50-1.40  

 

 BUN/Creatinine Ratio  12.0    8-20  

 

 Calcium  9.2 mg/dL    8.1-9.9  

 

 Egfr Non-  74.3    &gt;60  

 

 Egfr   95.6    &gt;60  28









 Basic Metabolic Panel  2012  Sodium  137 mmol/L    135-145  









 Potassium  4.1 mmol/L    3.5-5.0  

 

 Chloride  101 mmol/L    101-111  

 

 Co2 (Carbon Dioxide)  33.0 mmol/L  High  22-32  

 

 Anion Gap  3.0 mmol/L    2-11  29

 

 Glucose  111 mg/dL  High    

 

 BUN  12 mg/dL    6-24  

 

 Creatinine  0.8 mg/dL    0.50-1.40  

 

 One Over Creatinine  1.25      

 

 BUN/Creatinine Ratio  15.0    8-20  

 

 Calcium  9.2 mg/dL    8.1-9.9  

 

 eGFR Non-  96.1    &gt; 60  

 

 eGFR   123.6    &gt; 60  30









 Lipid Profile (Trig/Chol/HDL)  2011  Triglyceride  223 mg/dL  High  40-
200  









 Cholesterol  282 mg/dL  High  Less Than 200  31

 

 High Density Lipoprotein  53 mg/dL    40-60  32

 

 Cholesterol/HDL Ratio  5.32 AVERAGE  High  1-4.97  

 

 Low Density Lipoprotein  184 mg/dL  High  Less Than 100  33









 Laboratory test finding  2011  CPK (Creatine Kinase)  213 U/L  High  0-
200  









 Ast (Sgot)  21 U/L    12-42  

 

 Alt (SGPT)  20 U/L    17-63  









 Lipid Profile (Trig/Chol/HDL)  2011  Triglyceride  188 mg/dL      









 Cholesterol  262 mg/dL  High  Less Than 200  34

 

 High Density Lipoprotein  45 mg/dL    40-60  35

 

 Cholesterol/HDL Ratio  5.82 AVERAGE  High  1-4.97  

 

 Low Density Lipoprotein  179 mg/dL  High  Less Than 100  36









 Laboratory test finding  2011  Ast (Sgot)  20 U/L    12-42  









 Alt (SGPT)  18 U/L    17-63  

 

 CPK (Creatine Kinase)  162 U/L    0-200  









 Liver Function Panel  2010  Total Protein  5.8 GM/DL  Low  6.2-8.1  









 Albumin  4.0 GM/DL    3.2-5.2  

 

 Globulin  1.8 GM/DL  Low  2-4  

 

 Albumin/Globulin Ratio  2.2    1-3  

 

 Bilirubin Total  1.3 mg/dL    0.4-1.5  37

 

 Bilirubin Direct  0.2 mg/dL    0.1-0.5  

 

 Indirect Bilirubin  1.1 mg/dL  High  0.3-1.0  38

 

 Alkaline Phosphatase  55 U/L      

 

 Alt (SGPT)  18 U/L    17-63  

 

 Ast (Sgot)  22 U/L    12-42  









 Lipid Profile (Trig/Chol/HDL)  2010  Triglyceride  115 mg/dL      









 Cholesterol  216 mg/dL  High  Less Than 200  39

 

 High Density Lipoprotein  49 mg/dL    40-60  40

 

 Cholesterol/HDL Ratio  4.41 AVERAGE    1-4.97  

 

 Low Density Lipoprotein  144 mg/dL  High  Less Than 100  41









 Laboratory test finding  2010  CPK (Creatine Kinase)  209 U/L  High  0-
200  

 

 Laboratory test finding  2010  CPK (Creatine Kinase)  149 U/L    0-200  

 

 Laboratory test finding  2010  PSA,Diagnostic  1.62 NG/ML    0-4  42

 

 Lipid Profile  2010  Triglyceride  194 mg/dL      



 (Trig/Chol/HDL)            









 Cholesterol  286 mg/dL  High  Less Than 200  43

 

 High Density Lipoprotein  42 mg/dL    40-60  44

 

 Cholesterol/HDL Ratio  6.81 AVERAGE  High  1-4.97  

 

 Low Density Lipoprotein  205 mg/dL  High  Less Than 100  45









 Laboratory test finding  2010  CPK (Creatine Kinase)  192 U/L    0-200  
46

 

 Liver Function Panel  2010  Total Protein  6.0 GM/DL  Low  6.2-8.1  46









 Albumin  3.7 GM/DL    3.2-5.2  46

 

 Globulin  2.3 GM/DL    2-4  46

 

 Albumin/Globulin Ratio  1.6    1-3  46

 

 Bilirubin Total  1.1 mg/dL    0.4-1.5  46, 47

 

 Bilirubin Direct  0.4 mg/dL    0.1-0.5  46

 

 Indirect Bilirubin  0.7 mg/dL    0.1-0.75  46

 

 Alkaline Phosphatase  48 U/L      46

 

 Alt (SGPT)  20 U/L    17-63  46

 

 Ast (Sgot)  30 U/L    12-42  46









 Lipid Profile (Trig/Chol/HDL)  2010  Triglyceride  172 mg/dL      
46









 Cholesterol  237 mg/dL  High  Less Than 200  46, 48

 

 High Density Lipoprotein  40 mg/dL    40-60  46, 49

 

 Cholesterol/HDL Ratio  5.93 AVERAGE  High  1-4.97  46

 

 Low Density Lipoprotein  163 mg/dL  High  Less Than 100  46, 50









 Lipid Profile (Trig/Chol/HDL)  2010  Triglyceride  91 mg/dL      









 Cholesterol  195 mg/dL    Less Than 200  51

 

 High Density Lipoprotein  44 mg/dL    40-60  52

 

 Cholesterol/HDL Ratio  4.30 AVERAGE    1-4.97  

 

 Low Density Lipoprotein  127 mg/dL  High  Less Than 100  53









 Comp Metabolic Panel  2010  Sodium  138 mmol/L    135-145  









 Potassium  4.1 mmol/L    3.5-5.0  

 

 Chloride  103 mmol/L    101-111  

 

 Co2 (Carbon Dioxide)  29.0 mmol/L    22-32  

 

 Anion Gap  6.0 mmol/L    2-11  54

 

 Glucose  88 mg/dL      55

 

 BUN  18 mg/dL    6-24  

 

 Creatinine  0.87 mg/dL    0.50-1.40  

 

 One Over Creatinine  1.10      

 

 BUN/Creatinine Ratio  20.7  High  8-20  

 

 Calcium  9.0 mg/dL    8.1-9.9  56

 

 Total Protein  6.5 GM/DL    6.2-8.1  

 

 Albumin  3.9 GM/DL    3.2-5.2  

 

 Globulin  2.6 GM/DL    2-4  

 

 Albumin/Globulin Ratio  1.5    1-3  

 

 Bilirubin Total  1.3 mg/dL    0.4-1.5  57

 

 Alkaline Phosphatase  46 U/L      

 

 Alt (SGPT)  22 U/L    17-63  

 

 Ast (Sgot)  25 U/L    12-42  

 

 eGFR Non-  93.3    &gt; 60  

 

 eGFR   112.9    &gt; 60  58









 Lipid Panel - [facility]  2010  CPK (Creatine Kinase)  211 U/L  High  0-200  

 

 Laboratory test finding  2009  Glucose  91 mg/dL      46, 59









 BUN  12 mg/dL    6-24  46









 CBC With Electronic Diff  2009  White Blood Count  6.6 CUMM    4.8-10.8  
46









 Red Cell Count  4.94 CUMM    4.6-6.2  46

 

 Hemoglobin  15.5 g/dL    14.0-18.0  46

 

 Hematocrit  45 %    42-52  46

 

 Mean Corpuscular Volume  91 um3    80-94  46

 

 Mean Corpuscular Hemoglob  32 pg  High  27-31  46

 

 Mean Corpuscular HGB Cone  35 g/dL    32-36  46

 

 Redcell Distribution WDTH  13 %    10.5-15  46

 

 Platelet Count  212 CUMM    150-450  46

 

 Mean Platelet Volume  7.7 um3    7.4-10.4  46

 

 Gran %  68.6 %    38-83  46

 

 Lymph %  16.7 %  Low  25-47  46

 

 Mononuclear %  12.1 %  High  1-9  46

 

 Eosinophil %  2.3 %    0-6  46

 

 Basophil %  0.3 %    0-2  46

 

 Abs Lymphs  1.1    1.0-4.8  46

 

 Abs Mononuclear  0.8    0-0.8  46

 

 Absolute Neutrophil Count  4.6    1.5-7.7  46

 

 Abs Eosinophils  0.2    0-0.6  46

 

 Abs Basophils  0    0-0.2  46, 60









 Liver Function Panel  2009  Total Protein  6.0 GM/DL  Low  6.2-8.1  46









 Albumin  3.6 GM/DL    3.2-5.2  46

 

 Globulin  2.4 GM/DL    2-4  46

 

 Albumin/Globulin Ratio  1.5    1-3  46

 

 Bilirubin Total  1.1 mg/dL    0.4-1.5  46, 61

 

 Bilirubin Direct  0.2 mg/dL    0.1-0.5  46

 

 Indirect Bilirubin  0.9 mg/dL  High  0.1-0.75  46

 

 Alkaline Phosphatase  54 U/L      46

 

 Alt (SGPT)  18 U/L    17-63  46

 

 Ast (Sgot)  19 U/L    12-42  46









 Lipid Profile (Trig/Chol/HDL)  2009  Triglyceride  155 mg/dL      
46









 Cholesterol  255 mg/dL  High  Less Than 200  46, 62

 

 High Density Lipoprotein  40 mg/dL    40-60  46, 63

 

 Cholesterol/HDL Ratio  6.38 AVERAGE  High  1-4.97  46

 

 Low Density Lipoprotein  184 mg/dL  High  Less Than 100  46, 64









 Liver Function Panel  2009  Total Protein  5.7 GM/DL  Low  6.2-8.1  









 Albumin  3.6 GM/DL    3.2-5.2  

 

 Globulin  2.1 GM/DL    2-4  

 

 Albumin/Globulin Ratio  1.7    1-3  

 

 Bilirubin Total  1.0 mg/dL    0.4-1.5  65

 

 Bilirubin Direct  0.1 mg/dL    0.1-0.5  

 

 Indirect Bilirubin  0.9 mg/dL  High  0.1-0.75  

 

 Alkaline Phosphatase  45 U/L      

 

 Alt (SGPT)  24 U/L    17-63  

 

 Ast (Sgot)  24 U/L    12-42  









 Lipid Profile (Trig/Chol/HDL)  2009  Triglyceride  117 mg/dL      









 Cholesterol  256 mg/dL  High  Less Than 200  66

 

 High Density Lipoprotein  45 mg/dL    40-60  67

 

 Cholesterol/HDL Ratio  5.69 AVERAGE  High  1-4.97  

 

 Low Density Lipoprotein  188 mg/dL  High  Less Than 100  68









 Basic Metabolic Panel  2009  Sodium  140 mmol/L    135-145  









 Potassium  4.3 mmol/L    3.5-5.0  

 

 Chloride  101 mmol/L    101-111  

 

 Co2 (Carbon Dioxide)  30.0 mmol/L    22-32  

 

 Anion Gap  9.0 mmol/L    2-11  69

 

 Glucose  93 mg/dL      70

 

 BUN  8 mg/dL    6-24  

 

 Creatinine  0.90 mg/dL    0.50-1.40  

 

 One Over Creatinine  1.10      

 

 BUN/Creatinine Ratio  8.9    8-20  

 

 Calcium  9.5 mg/dL    8.1-9.9  71









 Laboratory test finding  2009  CPK (Creatine Kinase)  192 U/L    0-200  

 

 Comp Metabolic Panel  2009  Sodium  139 mmol/L    135-145  









 Potassium  4.0 mmol/L    3.5-5.0  

 

 Chloride  102 mmol/L    101-111  

 

 Co2 (Carbon Dioxide)  30.0 mmol/L    22-32  

 

 Anion Gap  7.0 mmol/L    2-11  72

 

 Glucose  92 mg/dL      73

 

 BUN  12 mg/dL    6-24  

 

 Creatinine  0.90 mg/dL    0.50-1.40  

 

 One Over Creatinine  1.10      

 

 BUN/Creatinine Ratio  13.3    8-20  

 

 Calcium  9.5 mg/dL    8.1-9.9  74

 

 Total Protein  6.7 GM/DL    6.2-8.1  

 

 Albumin  4.0 GM/DL    3.2-5.2  

 

 Globulin  2.7 GM/DL    2-4  

 

 Albumin/Globulin Ratio  1.5    1-3  

 

 Bilirubin Total  1.3 mg/dL    0.4-1.5  

 

 Alkaline Phosphatase  63 U/L      

 

 Alt (SGPT)  24 U/L    17-63  

 

 Ast (Sgot)  25 U/L    12-42  









 Lipid Profile (Trig/Chol/HDL)  2009  Triglyceride  133 mg/dL      









 Cholesterol  203 mg/dL  High  Less Than 200  75

 

 High Density Lipoprotein  52 mg/dL    40-60  76

 

 Cholesterol/HDL Ratio  3.90 AVERAGE    1-4.97  

 

 Low Density Lipoprotein  124 mg/dL  High  Less Than 100  77









 Laboratory test finding  2009  CPK (Creatine Kinase)  333 U/L  High  0-
200  









 PSA,Diagnostic  0.81 NG/ML    0-4  78









 Laboratory test finding  2009  Troponin-I (TnI)  0.05 NG/ML    0-0.06  79

 

 CBC With Electronic Diff Stat  2009  White Blood Count  5.7 CUMM    4.8-
10.8  









 Red Cell Count  4.89 CUMM    4.6-6.2  

 

 Hemoglobin  15.3 g/dL    14.0-18.0  

 

 Hematocrit  44 %    42-52  

 

 Mean Corpuscular Volume  89 um3    80-94  

 

 Mean Corpuscular Hemoglob  31 pg    27-31  

 

 Mean Corpuscular HGB Cone  35 g/dL    32-36  

 

 Redcell Distribution WDTH  13 %    10.5-15  

 

 Platelet Count  245 CUMM    150-450  

 

 Mean Platelet Volume  7.8 um3    7.4-10.4  

 

 Gran %  52.0 %    38-83  

 

 Lymph %  31.7 %    25-47  

 

 Mononuclear %  12.3 %  High  1-9  

 

 Eosinophil %  3.0 %    0-6  

 

 Basophil %  1.0 %    0-2  

 

 Abs Lymphs  1.8    1.0-4.8  

 

 Abs Mononuclear  0.7    0-0.8  

 

 Absolute Neutrophil Count  2.9    1.5-7.7  

 

 Abs Eosinophils  0.2    0-0.6  

 

 Abs Basophils  0.1    0-0.2  









 P33S  2009  Sodium  140 mmol/L    135-145  









 Potassium  3.5 mmol/L    3.5-5.0  

 

 Chloride  102 mmol/L    101-111  

 

 Co2 (Carbon Dioxide)  30.0 mmol/L    22-32  

 

 Anion Gap  8.0 mmol/L    2-11  80

 

 Glucose  85 mg/dL      81

 

 BUN  11 mg/dL    6-24  

 

 Creatinine  0.80 mg/dL    0.50-1.40  

 

 One Over Creatinine  1.20      

 

 BUN/Creatinine Ratio  13.8    8-20  

 

 Calcium  9.3 mg/dL    8.1-9.9  82

 

 Total Protein  6.4 GM/DL    6.2-8.1  

 

 Albumin  3.9 GM/DL    3.2-5.2  

 

 Globulin  2.5 GM/DL    2-4  

 

 Albumin/Globulin Ratio  1.6    1-3  

 

 Bilirubin Total  0.8 mg/dL    0.4-1.5  

 

 Alkaline Phosphatase  55 U/L      

 

 Alt (SGPT)  20 U/L    17-63  

 

 Ast (Sgot)  24 U/L    12-42  









 Laboratory test finding  2009  Troponin-I (TnI)  0 NG/ML    0-0.06  83









 1  Desirable: &lt;150



   Borderline High: 150-199



   High: 200-499



   Very High: &gt;500

 

 2  Desirable: &lt;200



   Borderline High: 200-239



   High: &gt;239

 

 3  Low: &lt;40



   Desirable: 40-60



   High: &gt;60

 

 4  Desirable: &lt;100



   Near Optimal: 100-129



   Borderline High: 130-159



   High: 160-189



   Very High: &gt;189

 

 5  *******Because ethnic data is not always readily available,



   this report includes an eGFR for both -Americans and



   non- Americans.****



   The National Kid[employer] Education Program (NKDEP) does



   not endorse the use of the MDRD equation for patients that



   are not between the ages of 18 and 70, are pregnant, have



   extremes of body size, muscle mass, or nutritional status,



   or are non- or non-.



   According to the [employer], irrespective of



   diagnosis, the stage of the disease is based on the level of



   kidney function:



   Stage Description                      GFR(mL/min/1.73 m(2))



   1     Kidney damage with normal or decreased GFR       90



   2     Kidney damage with mild decrease in GFR          60-89



   3     Moderate decrease in GFR                         30-59



   4     Severe decrease in GFR                           15-29



   5     Kidney failure                       &lt;15 (or dialysis)

 

 6  fasting

 

 7  FASTING



   fasting



   before next ov

 

 8  *******Because ethnic data is not always readily available,



   this report includes an eGFR for both -Americans and



   non- Americans.****



   The National Kid[employer] Disease Education Program (NKDEP) does



   not endorse the use of the MDRD equation for patients that



   are not between the ages of 18 and 70, are pregnant, have



   extremes of body size, muscle mass, or nutritional status,



   or are non- or non-.



   According to the [employer], irrespective of



   diagnosis, the stage of the disease is based on the level of



   kidney function:



   Stage Description                      GFR(mL/min/1.73 m(2))



   1     Kidney damage with normal or decreased GFR       90



   2     Kidney damage with mild decrease in GFR          60-89



   3     Moderate decrease in GFR                         30-59



   4     Severe decrease in GFR                           15-29



   5     Kidney failure                       &lt;15 (or dialysis)

 

 9  Desirable &lt;150



   Borderline high 150-199



   High 200-499



   Very High &gt;500

 

 10  Desirable &lt;200



   Borderline high 200-239



   High &gt;239

 

 11  Low &lt;40



   Desirable: 40-60



   High: &gt;60

 

 12  Desirable: &lt;100 mg/dL



   Near Optimal: 100-129 mg/dL



   Borderline High: 130-159 mg/dL



   High: 160-189 mg/dL



   Very High: &gt;189 mg/dL

 

 13  FASTING



   fasting

 

 14  *******Because ethnic data is not always readily available,



   this report includes an eGFR for both -Americans and



   non- Americans.****



   The National Kid[employer] Disease Education Program (NKDEP) does



   not endorse the use of the MDRD equation for patients that



   are not between the ages of 18 and 70, are pregnant, have



   extremes of body size, muscle mass, or nutritional status,



   or are non- or non-.



   According to the [employer], irrespective of



   diagnosis, the stage of the disease is based on the level of



   kidney function:



   Stage Description                      GFR(mL/min/1.73 m(2))



   1     Kidney damage with normal or decreased GFR       90



   2     Kidney damage with mild decrease in GFR          60-89



   3     Moderate decrease in GFR                         30-59



   4     Severe decrease in GFR                           15-29



   5     Kidney failure                       &lt;15 (or dialysis)

 

 15  Desirable &lt;150



   Borderline high 150-199



   High 200-499



   Very High &gt;500

 

 16  Desirable &lt;200



   Borderline high 200-239



   High &gt;239

 

 17  Low &lt;40



   Desirable: 40-60



   High: &gt;60

 

 18  Desirable: &lt;100 mg/dL



   Near Optimal: 100-129 mg/dL



   Borderline High: 130-159 mg/dL



   High: 160-189 mg/dL



   Very High: &gt;189 mg/dL

 

 19  Desirable &lt;150



   Borderline high 150-199



   High 200-499



   Very High &gt;500

 

 20  Desirable &lt;200



   Borderline high 200-239



   High &gt;239

 

 21  Low &lt;40



   Desirable: 40-60



   High: &gt;60

 

 22  Desirable: &lt;100 mg/dL



   Near Optimal: 100-129 mg/dL



   Borderline High: 130-159 mg/dL



   High: 160-189 mg/dL



   Very High: &gt;189 mg/dL

 

 23  *******Because ethnic data is not always readily available,



   this report includes an eGFR for both -Americans and



   non- Americans.****



   The National Kidney Disease Education Program (NKDEP) does



   not endorse the use of the MDRD equation for patients that



   are not between the ages of 18 and 70, are pregnant, have



   extremes of body size, muscle mass, or nutritional status,



   or are non- or non-.



   According to the National Kidney Foundation, irrespective of



   diagnosis, the stage of the disease is based on the level of



   kidney function:



   Stage Description                      GFR(mL/min/1.73 m(2))



   1     Kidney damage with normal or decreased GFR       90



   2     Kidney damage with mild decrease in GFR          60-89



   3     Moderate decrease in GFR                         30-59



   4     Severe decrease in GFR                           15-29



   5     Kidney failure                       &lt;15 (or dialysis)

 

 24  Fasting- baseline prior to PCSK9

 

 25  *******Because ethnic data is not always readily available,



   this report includes an eGFR for both -Americans and



   non- Americans.****



   The National Kidney Disease Education Program (NKDEP) does



   not endorse the use of the MDRD equation for patients that



   are not between the ages of 18 and 70, are pregnant, have



   extremes of body size, muscle mass, or nutritional status,



   or are non- or non-.



   According to the National Kidney Foundation, irrespective of



   diagnosis, the stage of the disease is based on the level of



   kidney function:



   Stage Description                      GFR(mL/min/1.73 m(2))



   1     Kidney damage with normal or decreased GFR       90



   2     Kidney damage with mild decrease in GFR          60-89



   3     Moderate decrease in GFR                         30-59



   4     Severe decrease in GFR                           15-29



   5     Kidney failure                       &lt;15 (or dialysis)

 

 26  PLEASE HOLD PATINET

 

 27  Reference Range and Interpretation:



   TnI (ng/mL)             Interpretation



   Less Than 0.03 ng/mL    Not supportive of diagnosis of MI



   0.03 - 0.50 ng/mL       Indeterminate: suggest serial



   studies if clinically indicated.



   Greater than 0.5 ng/mL  Consistent with diagnosis of MI

 

 28  *******Because ethnic data is not always readily available,



   this report includes an eGFR for both -Americans and



   non- Americans.****



   The National Kidney Disease Education Program (NKDEP) does



   not endorse the use of the MDRD equation for patients that



   are not between the ages of 18 and 70, are pregnant, have



   extremes of body size, muscle mass, or nutritional status,



   or are non- or non-.



   According to the National Kidney Foundation, irrespective of



   diagnosis, the stage of the disease is based on the level of



   kidney function:



   Stage Description                      GFR(mL/min/1.73 m(2))



   1     Kidney damage with normal or decreased GFR       90



   2     Kidney damage with mild decrease in GFR          60-89



   3     Moderate decrease in GFR                         30-59



   4     Severe decrease in GFR                           15-29



   5     Kidney failure                       &lt;15 (or dialysis)

 

 29  Anion gap measurement may be of limited value in the



   presence of any alkalosis, especially in a combined acid



   base disorder.



   .

 

 30  *******Because ethnic data is not always readily available,



   this report includes an eGFR for both -Americans and



   non- Americans.****



   The National Kidney Disease Education Program (NKDEP) does



   not endorse the use of the MDRD equation for patients that



   are not between the ages of 18 and 70, are pregnant, have



   extremes of body size, muscle mass, or nutritional status,



   or are non- or non-.



   According to the National Kidney Foundation, irrespective of



   diagnosis, the stage of the disease is based on the level of



   kidney function:



   Stage Description                      GFR(mL/min/1.73 m(2))



   1     Kidney damage with normal or decreased GFR       90



   2     Kidney damage with mild decrease in GFR          60-89



   3     Moderate decrease in GFR                         30-59



   4     Severe decrease in GFR                           15-29



   5     Kidney failure                       &lt;15 (or dialysis)

 

 31  CHOLESTEROL INTERPRETATION:



   Desirable:  Less than 200 MG/DL



   Borderline-High Risk:  200-239 MG/DL



   High-Risk:  240 MG/DL and over

 

 32  HDL INTERPRETATION:



   Undesirable: High Risk:  Less than 40 MG/DL



   Desirable:  Low Risk:  Greater than 60 MG/DL

 

 33  LDL INTERPRETATION:



   Low Risk Optimal Level:  LDL Less than 100 MG/DL



   Near or Above Optimal:  -129 MG/DL



   Borderline High Risk:  -159 MG/DL



   High Risk:  -189 MG/DL



   Very High Risk:  LDL Greater than 189 MG/DL

 

 34  CHOLESTEROL INTERPRETATION:



   Desirable:  Less than 200 MG/DL



   Borderline-High Risk:  200-239 MG/DL



   High-Risk:  240 MG/DL and over

 

 35  HDL INTERPRETATION:



   Undesirable: High Risk:  Less than 40 MG/DL



   Desirable:  Low Risk:  Greater than 60 MG/DL

 

 36  LDL INTERPRETATION:



   Low Risk Optimal Level:  LDL Less than 100 MG/DL



   Near or Above Optimal:  -129 MG/DL



   Borderline High Risk:  -159 MG/DL



   High Risk:  -189 MG/DL



   Very High Risk:  LDL Greater than 189 MG/DL

 

 37  A metabolite of Naproxen, O-desmethylnaproxen, has been



   shown to interfere with the Jendrassik-Nadeem method for



   measuring total bilirubin.  Samples from patients who have



   taken Naproxen have shown spurious elevation in total



   bilirubin levels.

 

 38  **Please note updated reference range, effective 7/22/10**

 

 39  CHOLESTEROL INTERPRETATION:



   Desirable:  Less than 200 MG/DL



   Borderline-High Risk:  200-239 MG/DL



   High-Risk:  240 MG/DL and over

 

 40  HDL INTERPRETATION:



   Undesirable: High Risk:  Less than 40 MG/DL



   Desirable:  Low Risk:  Greater than 60 MG/DL

 

 41  LDL INTERPRETATION:



   Low Risk Optimal Level:  LDL Less than 100 MG/DL



   Near or Above Optimal:  -129 MG/DL



   Borderline High Risk:  -159 MG/DL



   High Risk:  -189 MG/DL



   Very High Risk:  LDL Greater than 189 MG/DL

 

 42  *



   SERUM LEVELS OF PSA MEASURED USING THE [facility] [employer]



   ACCESS HYBRITECH IMMUNOASSAY SHOULD NOT BE INTERPRETED AS



   ABSOLUTE EVIDENCE OF THE PRESENCE OR ABSENCE OF DISEASE.



   THE PSA VALUE SHOULD BE USED IN CONJUNCTION WITH OTHER



   PERTINENT CLINICAL DIAGNOSTIC PROCEDURES.

 

 43  CHOLESTEROL INTERPRETATION:



   Desirable:  Less than 200 MG/DL



   Borderline-High Risk:  200-239 MG/DL



   High-Risk:  240 MG/DL and over

 

 44  HDL INTERPRETATION:



   Undesirable: High Risk:  Less than 40 MG/DL



   Desirable:  Low Risk:  Greater than 60 MG/DL

 

 45  LDL INTERPRETATION:



   Low Risk Optimal Level:  LDL Less than 100 MG/DL



   Near or Above Optimal:  -129 MG/DL



   Borderline High Risk:  -159 MG/DL



   High Risk:  -189 MG/DL



   Very High Risk:  LDL Greater than 189 MG/DL

 

 46  FASTING

 

 47  A metabolite of Naproxen, O-desmethylnaproxen, has been



   shown to interfere with the Jendrassik-Nazareth method for



   measuring total bilirubin.  Samples from patients who have



   taken Naproxen have shown spurious elevation in total



   bilirubin levels.

 

 48  CHOLESTEROL INTERPRETATION:



   Desirable:  Less than 200 MG/DL



   Borderline-High Risk:  200-239 MG/DL



   High-Risk:  240 MG/DL and over

 

 49  HDL INTERPRETATION:



   Undesirable: High Risk:  Less than 40 MG/DL



   Desirable:  Low Risk:  Greater than 60 MG/DL

 

 50  LDL INTERPRETATION:



   Low Risk Optimal Level:  LDL Less than 100 MG/DL



   Near or Above Optimal:  -129 MG/DL



   Borderline High Risk:  -159 MG/DL



   High Risk:  -189 MG/DL



   Very High Risk:  LDL Greater than 189 MG/DL

 

 51  CHOLESTEROL INTERPRETATION:



   Desirable:  Less than 200 MG/DL



   Borderline-High Risk:  200-239 MG/DL



   High-Risk:  240 MG/DL and over

 

 52  HDL INTERPRETATION:



   Undesirable: High Risk:  Less than 40 MG/DL



   Desirable:  Low Risk:  Greater than 60 MG/DL

 

 53  LDL INTERPRETATION:



   Low Risk Optimal Level:  LDL Less than 100 MG/DL



   Near or Above Optimal:  -129 MG/DL



   Borderline High Risk:  -159 MG/DL



   High Risk:  -189 MG/DL



   Very High Risk:  LDL Greater than 189 MG/DL

 

 54  Anion gap measurement may be of limited value in the



   presence of any alkalosis, especially in a combined acid



   base disorder.



   .

 

 55  ** Note change in reference range as of 08.  The



   change was based on recommendations from the American



   Diabetes Association.

 

 56  Please note change in reference range effective 08



   



   



   .

 

 57  A metabolite of Naproxen, O-desmethylnaproxen, has been



   shown to interfere with the Jendrassik-Nazareth method for



   measuring total bilirubin.  Samples from patients who have



   taken Naproxen have shown spurious elevation in total



   bilirubin levels.

 

 58  *******Because ethnic data is not always readily available,



   this report includes an eGFR for both -Americans and



   non- Americans.****



   The National Kidney Disease Education Program (NKDEP) does



   not endorse the use of the MDRD equation for patients that



   are not between the ages of 18 and 70, are pregnant, have



   extremes of body size, muscle mass, or nutritional status,



   or are non- or non-.



   According to the National Kidney Foundation, irrespective of



   diagnosis, the stage of the disease is based on the level of



   kidney function:



   Stage Description                      GFR(mL/min/1.73 m(2))



   1     Kidney damage with normal or decreased GFR       90



   2     Kidney damage with mild decrease in GFR          60-89



   3     Moderate decrease in GFR                         30-59



   4     Severe decrease in GFR                           15-29



   5     Kidney failure                       &lt;15 (or dialysis)

 

 59  ** Note change in reference range as of 08.  The



   change was based on recommendations from the American



   Diabetes Association.

 

 60  Lymphopenia %

 

 61  A metabolite of Naproxen, O-desmethylnaproxen, has been



   shown to interfere with the Jendrassik-Nadeem method for



   measuring total bilirubin.  Samples from patients who have



   taken Naproxen have shown spurious elevation in total



   bilirubin levels.

 

 62  CHOLESTEROL INTERPRETATION:



   Desirable:  Less than 200 MG/DL



   Borderline-High Risk:  200-239 MG/DL



   High-Risk:  240 MG/DL and over

 

 63  HDL INTERPRETATION:



   Undesirable: High Risk:  Less than 40 MG/DL



   Desirable:  Low Risk:  Greater than 60 MG/DL

 

 64  LDL INTERPRETATION:



   Low Risk Optimal Level:  LDL Less than 100 MG/DL



   Near or Above Optimal:  -129 MG/DL



   Borderline High Risk:  -159 MG/DL



   High Risk:  -189 MG/DL



   Very High Risk:  LDL Greater than 189 MG/DL

 

 65  A metabolite of Naproxen, O-desmethylnaproxen, has been



   shown to interfere with the Jendrassik-Nazareth method for



   measuring total bilirubin.  Samples from patients who have



   taken Naproxen have shown spurious elevation in total



   bilirubin levels.

 

 66  CHOLESTEROL INTERPRETATION:



   Desirable:  Less than 200 MG/DL



   Borderline-High Risk:  200-239 MG/DL



   High-Risk:  240 MG/DL and over

 

 67  HDL INTERPRETATION:



   Undesirable: High Risk:  Less than 40 MG/DL



   Desirable:  Low Risk:  Greater than 60 MG/DL

 

 68  LDL INTERPRETATION:



   Low Risk Optimal Level:  LDL Less than 100 MG/DL



   Near or Above Optimal:  -129 MG/DL



   Borderline High Risk:  -159 MG/DL



   High Risk:  -189 MG/DL



   Very High Risk:  LDL Greater than 189 MG/DL

 

 69  Anion gap measurement may be of limited value in the



   presence of any alkalosis, especially in a combined acid



   base disorder.



   .

 

 70  ** Note change in reference range as of 08.  The



   change was based on recommendations from the American



   Diabetes Association.

 

 71  Please note change in reference range effective 08



   



   



   .

 

 72  Anion gap measurement may be of limited value in the



   presence of any alkalosis, especially in a combined acid



   base disorder.



   .

 

 73  ** Note change in reference range as of 08.  The



   change was based on recommendations from the American



   Diabetes Association.

 

 74  Please note change in reference range effective 08



   



   



   .

 

 75  CHOLESTEROL INTERPRETATION:



   Desirable:  Less than 200 MG/DL



   Borderline-High Risk:  200-239 MG/DL



   High-Risk:  240 MG/DL and over

 

 76  HDL INTERPRETATION:



   Undesirable: High Risk:  Less than 40 MG/DL



   Desirable:  Low Risk:  Greater than 60 MG/DL

 

 77  LDL INTERPRETATION:



   Low Risk Optimal Level:  LDL Less than 100 MG/DL



   Near or Above Optimal:  -129 MG/DL



   Borderline High Risk:  -159 MG/DL



   High Risk:  -189 MG/DL



   Very High Risk:  LDL Greater than 189 MG/DL

 

 78  *



   SERUM LEVELS OF PSA MEASURED USING THE [facility]



   ACCESS HYBRITECH IMMUNOASSAY SHOULD NOT BE INTERPRETED AS



   ABSOLUTE EVIDENCE OF THE PRESENCE OR ABSENCE OF DISEASE.



   THE PSA VALUE SHOULD BE USED IN CONJUNCTION WITH OTHER



   PERTINENT CLINICAL DIAGNOSTIC PROCEDURES.

 

 79  New Reference Range and Interpretation effective 10/4/02



   



   TnI (ng/ml)             INTERPRETATION



   



   &lt;0.06 ng/ml             NOT SUPPORTIVE OF DIAGNOSIS OF MI



   



   0.06 - 0.50 ng/ml       INDETERMINATE: SUGGEST SERIAL



   STUDIES IF CLINICALLY INDICATED.



   



   &gt; 0.5 ng/ml             CONSISTENT WITH DIAGNOSIS OF MI



   .

 

 80  Anion gap measurement may be of limited value in the



   presence of any alkalosis, especially in a combined acid



   base disorder.



   .

 

 81  ** Note change in reference range as of 08.  The



   change was based on recommendations from the American



   Diabetes Association.

 

 82  Please note change in reference range effective 08



   



   



   .

 

 83  New Reference Range and Interpretation effective 10/4/02



   



   TnI (ng/ml)             INTERPRETATION



   



   &lt;0.06 ng/ml             NOT SUPPORTIVE OF DIAGNOSIS OF MI



   



   0.06 - 0.50 ng/ml       INDETERMINATE: SUGGEST SERIAL



   STUDIES IF CLINICALLY INDICATED.



   



   &gt; 0.5 ng/ml             CONSISTENT WITH DIAGNOSIS OF MI



   .







Procedures







 Date  CPT Code  Description  Status

 

 2018  23612  EKG Tracing &amp; Interpretation  Completed

 

 02/15/2018  94355  Interrogation Device Eval,Implantable Loop Recorder  
Completed



     System  

 

 02/15/2018  83116  Interrogation Device Eval,Implantable Loop Recorder  
Completed



     System  

 

 2017  57328  Interrogation Device Eval,Implantable Loop Recorder  
Completed



     System  

 

 10/20/2017  94112  Anoscopy  Completed

 

 2017  54088  Interrogation Device Eval,Implantable Loop Recorder  
Completed



     System  

 

 2017  44575  EKG Tracing &amp; Interpretation  Completed

 

 2017  91467  Implantable Cardio System Loop Recorder Sys Remota Data  
Completed



     Acquistio  

 

 2017  18139  Interrogation Dev Loop Recorder Incl Physician  Completed



     Analysis,Rev,Repor  

 

 2017  73181  Implantable Cardio System Loop Recorder Sys Remota Data  
Completed



     Acquistio  

 

 2017  11782  Interrogation Dev Loop Recorder Incl Physician  Completed



     Analysis,Rev,Repor  

 

 2017  43722  Implantable Cardio System Loop Recorder Sys Remota Data  
Completed



     Acquistio  

 

 2017  38593  Interrogation Dev Loop Recorder Incl Physician  Completed



     Analysis,Rev,Repor  

 

 2017  97529  Inject/Drain Joint/Bursa Small  Completed

 

 2017  68252  Implant Cardiac Loop Recorder  Completed

 

 2017  44805  Stress Test Supervsn W/Out I/R  Completed

 

 2017  85299  Treadmill Interp/Report Only  Completed

 

 2017  37117  Short Arm Cast Application  Completed

 

 2016  88130  Mobile Cardiovascular Telemetry Over 24 HR Up To 30  
Completed



     Days  

 

 2016  43031  Color Flow Doppler/Interp &amp; Reprt  Completed

 

 2016  50246  Pulse Wave/Continuous-Interp.RPT  Completed

 

 2016  79272  Echocardiography, Transesophageal, Real Time W/Image 2D  
Completed



     W/W/O M-M  

 

 2016  16981  ECHO Transthorasic Realtime 2D W Doppler &amp; Color  
Completed



     Flow Hosp  

 

 2016  57672  EKG Tracing &amp; Interpretation  Completed

 

 2016  52008  ECHO Transthoracic, Real-Time 2D With Doppler And Color  
Completed



     Flow  

 

 2016  47256  Stress Test  Completed

 

 2016  69440  Myocardial Perfusion Imaging Tomographic (Spect)  Completed



     Multiple Studies  

 

 2015  06758  EKG Tracing &amp; Interpretation  Completed

 

 2015  64963  ECHO Transthoracic, Real-Time 2D With Doppler And Color  
Completed



     Flow  

 

 10/31/2014  77159  Stress Test  Completed

 

 10/31/2014  67568  Myocardial Perfusion Imaging Tomographic (Spect)  Completed



     Multiple Studies  

 

 10/31/2014  56763  Myocardial Perfusion Imaging Tomographic (Spect)  Completed



     Multiple Studies  

 

 10/27/2014  16953  EKG Tracing &amp; Interpretation  Completed

 

 2014  51230  ECHO Transthoracic, Real-Time 2D With Doppler And Color  
Completed



     Flow  

 

 2014  88069  EKG Tracing &amp; Interpretation  Completed

 

 2013  85903  EKG Tracing &amp; Interpretation  Completed

 

 2013  85817  EKG Tracing &amp; Interpretation  Completed

 

 2012  97325  EKG Tracing &amp; Interpretation  Completed

 

 2011  76040  EKG Tracing &amp; Interpretation  Completed

 

 10/04/2010  15773  ECHO Transthoracic, Real-Time 2D With Doppler And Color  
Completed



     Flow  

 

 2010  92026  ECHO Stress Test Incl Perf Contiuous ekg Monitoring  
Completed



     W/Phys Superv  

 

 2009  03316  EKG Tracing &amp; Interpretation  Completed

 

 2009  40654  ECHO Stress Test Incl Perf Contiuous ekg Monitoring  
Completed



     W/Phys Superv  

 

 2009  04036  ECHO/Stress  Completed

 

 2009  01734  Stress Test  Completed

 

 2009  30815  EKG Tracing &amp; Interpretation  Completed

 

 2008  33070  Echocardiogram  Completed

 

 2008  97586  Echocardiogram  Completed

 

 2008  96546  Pulse Doppler &amp; Continuous Wave  Completed

 

 2008  45194  Pulse Doppler &amp; Continuous Wave  Completed

 

 2008  43345  Color Doppler  Completed

 

 2008  20340  Color Doppler  Completed

 

 2008  87210  Color Doppler  Completed

 

 2008  00200  EKG Tracing &amp; Interpretation  Completed

 

 2008  00915  EKG Tracing &amp; Interpretation  Completed

 

 10/31/2007  45671  Left Heart Catheterization  Completed

 

 10/31/2007  50475  Inj Proc LFT Vent/LFT Atrl Angio  Completed

 

 10/31/2007  28104  Inj Proc LFT Vent/LFT Atrl Angio  Completed

 

 10/31/2007  12918  Coronary Angiography  Completed

 

 10/31/2007  68085  S/I/R Inj Proc Vent And Or Atrial  Completed

 

 10/31/2007  02409  S/I/R Inj Proc Vent And Or Atrial  Completed

 

 10/31/2007  68870  Selective Coronary Angioplasty  Completed

 

 10/25/2007  22433  EKG Tracing &amp; Interpretation  Completed

 

 10/25/2007  18824  EKG Tracing &amp; Interpretation  Completed

 

 2007  85163  ECHO/Stress  Completed

 

 2007  07092  Stress Test  Completed

 

 2007  34035  Stress Test  Completed







Encounters







 Type  Date  Location  Provider  CPT E/M  Dx

 

 Office Visit  2018  3:00p  [facility]   Sun Mendieta, N.P.  
14553  I10



     [facility]      









 E78.5

 

 I35.0

 

 R94.31









 Office Visit  2018  3:00p  [facility]  Sun Mendieta, 
N.P.  [id number]  I10









 E78.5

 

 I35.0

 

 R94.31

 

 R07.9









 Office Visit  2017  3:20p  [facility]  Qutaybeh S. Maghaydah,  
[address]  I35.0



       M.DMiriam    









 I10

 

 E78.5

 

 R94.31









 Office Visit  2017  2:15p  [facility]  Darrius Zepeda MD  
38646  M18.12



     Of [facility]      

 

 Office Visit  2017  1:20p  [facility]  Markietayblalit S.  [address]  Z95.818



       CELESTE Smith    









 I35.0

 

 I10

 

 Z86.73

 

 E78.5









 Office Visit  2017  3:45p  [facility]  Darrius Zepeda MD  
07219  M18.12



     C.M.A.      

 

 Office Visit  2017  7:18a  [facility],  Teddy Pederson M.D.  
00757  R07.89



     Hospitalists      









 E78.5

 

 I10









 Office Visit  2017  2:15p  [facility]  Darrius Zepeda MD  
35006  M19.032



     C.M.A.      

 

 Office Visit  02/10/2017  1:45p  [facility]  Stiven Prajapati  
26529  I10



     Cma  M.DMiriam    









 I47.1

 

 I47.2









 Office Visit  2017  2:45p  [facility]  Darrius Zepeda MD  
04189  M19.032



     C.M.A.      

 

 Office Visit  2017  3:00p  [facility]  SATYA Marquez  [address]60  
G45.9



     Cma      









 I10

 

 I35.0

 

 I47.1

 

 I47.2









 Office Visit  12/15/2016 11:30a  [facility]  SATYA Marquez  [address]94  
G45.9









 I10

 

 E78.5

 

 I35.0

 

 I47.1









 Office Visit  2016  3:20p  [facility]  Markietayblalit SMiriam Smith,  
[address]247  G45.9



       M.DMiriam    









 I10

 

 E78.5

 

 I77.810









 Office Visit  2016  4:21p  [facility],  Darrius Sheridan,  
[address]75  G45.9



     Hospitalists  M.DMiriam    









 I10

 

 E78.5









 Office Visit  2016  1:45p  [facility]  Delroy Lambert MD  
16710  G45.9









 I10









 Office Visit  2016  4:19p  [facility],  Teddy Pederson M.D.  
30963  G45.9



     Hospitalists      









 I10

 

 E78.5









 Office Visit  2016 11:00a  [facility]  Markietayblalit SMiriam Smith,  
02311  I10



       M.DMiriam    









 E78.5

 

 I77.810

 

 I35.0









 Office Visit  2016  2:00p  [facility]  Qutaeh S. Maghaydah,  
[address]40  I10



       M.D.    









 E78.5









 Office Visit  2016  3:30p  [facility]  Nurse Visit   
43292  I10

 

 Office Visit  2016  3:30p  [facility]  SATYA Marquez  
24086  I10









 E78.5

 

 K21.9

 

 I77.810









 Office Visit  02/15/2016  3:30p  [facility]  SATYA Marquez  
12932  I10









 E78.5

 

 R07.9

 

 Z86.73









 Office Visit  2015  4:00p  [facility]  Qutaybeh S. Maghaydah,  
91521  401.1



       M.D.    









 272.4

 

 447.71

 

 V12.54









 Office Visit  2015  9:30a  [facility]  SATYA Marquez  [id number]  
401.1









 272.4

 

 447.71









 Office Visit  2014 11:00a  [facility]  Qutaybeh S. Maghaydah,  
97853  272.4



       M.D.    









 401.1

 

 786.50

 

 447.71









 Office Visit  2014  3:00p  [facility]  SATYA Marquez  
10023  272.4









 401.1

 

 794.31









 Office Visit  10/27/2014  3:30p  [facility]  SATYA Marquez  
70007  786.50









 272.4

 

 401.1









 Office Visit  2014  2:30p  [facility]  SATYA Marquez  16082  
272.4









 401.1

 

 794.31









 Office Visit  2014  3:00p  [facility]  SATYA Marquez  71075  
272.4









 785.2

 

 780.79









 Office Visit  2014  3:40p  [facility]  Qutaybeh S. Maghaydah,  
50981  401.1



       M.D.    









 272.4









 Office Visit  2013 10:00a  [facility]  Qutaybeh S. Maghaydah,  
69045  401.1



       M.D.    









 272.4

 

 794.31









 Office Visit  2013 11:00a  [facility]  [place],  
[address]  401.1



       M.D.    









 272.4

 

 794.31









 Office Visit  2012  3:30p  [facility]  [place],  
[address]  401.1



       M.D.    









 272.4









 Office Visit  2011  1:40p  [facility]  [place],  
[address]  401.1



       M.D.    









 272.4

 

 794.31









 Office Visit  10/06/2010  8:40a  [facility]  [place],  
[address]  401.1



       M.D.    









 272.4









 Office Visit  2010  9:20a  [facility]  [place],  
[address]  272.4



       M.D.    









 401.1









 Office Visit  2009  3:20p  [facility]  [place],  
[address]  272.4



       M.D.    









 401.0









 Office Visit  2009  4:00p  [facility]  [place],  
[address]  272.4



       M.D.    









 794.31

 

 401.0









 Office Visit  2008  3:20p  [facility]  [place],  
[address]  401.1



       M.D.    









 794.31

 

 272.4









 Office Visit  2007  2:00p  [facility]  [place],  
[address]  272.4



       M.D.    









 401.1

 

 786.50









 Office Visit  10/25/2007  2:20p  [facility]  [place],  
[address]  786.50



       M.D.    









 401.1

 

 272.4

 

 794.31









 Office Visit  2007 10:30a  [facility]  [place],  
[address]  786.50



       M.D.    









 401.1

 

 780.4

 

 272.4







Plan of Care

Future Appointment(s):2018  3:30 pm - Sun Mendieta N.P. at [facility]2018  2:40 pm - Pacemaker Schedule at [facility]04
/10/2018 - Qutaybeh S. Maghaydah, M.D.I10 Essential (primary) 
hypertensionFollow up:2 weeks Sun BP ov 8 months ov with me

## 2018-05-04 NOTE — ED
Allergic Reaction/Systemic





- HPI Summary


HPI Summary: 


Patient presents with worse hypertension this morning. Reports he checks his BP 

2  x day per Dr. Smith due to HTN, cardiac murmur and possible TIA linked w

/ cardiac condition (pt has an internal cardiac monitoring device). He admits 

systolic BP is typically in 130's, occasionally in 140's.  This morning he was 

191 and so came here for evaluation.  He denies headache, visual change, chest 

pain, chest tightness, shortness of breath, jaw pain, numbness, tingling, 

weakness, fatigue, nausea vomiting or diarrhea.  He admits he was anxious when 

he saw this number and had an episode of flushing which produced a warm sweat 

on the back of his neck however no heavy diaphoresis.  He takes multiple anti-

hypertensive medications 2 times a day and has not missed any doses.  Upon 

further investigation, he does say he feels a pounding in his ears and like his 

heart is racing although his heart rate is in the low 60s upon auscultation and 

finger heart rate monitoring.





Additionally, he is reporting skin itching and dryness over his forearms for 

the past week.  He realized late last night or this morning that this could be 

a reaction from the topical menthol he's been using on his hands and wrists for 

arthritis as he has a history of allergies to fragrance resulting in a similar 

reaction of rash with itching and dryness.  He was seen by dermatologist in the 

past for this condition and given clobetasol topical steroid which he has been 

applying to his forearms over the past 1 week.  He denies facial swelling, 

dysphasia, wheezing, chest tightness, nausea, vomiting, abdominal pain.





He also admits to a history of prostate cancer which is caused him to have 

urinary retention in the past.  This symptom has been progressively returning 

over the past 3 weeks and so he contacted Dr. Briceno to notify him.  Dr. Briceno 

prescribed him Rapaflo which the patient took at around 1900 last night.  He 

reports he's been urinating quite frequently and freely since without 

difficulty.  His chart does indicate a history of allergy to Flomax and this 

drug is in the same class - does not recall the reaction he had at that time 

and nothing listed in his allergy chart re: specifics of rxn.  He was wondering 

if this is causing his head to toe itching however after much conversation, he 

and his wife report he's had these symptoms slowly progressing over the course 

of the week.





Furthermore, he has a history of anxiety.  He was taking Klonopin 3 times a day 

however this was switched to buspirone about 1 month ago - no issues since.  He 

took one dose of buspirone this morning and feels this medication has been 

helpful since the transition.  He does admit he is quite anxious about all of 

his symptoms while here however feels relieved that his topical medication 

could be causing his skin issues and that the steroid he's been using could be 

causing his blood pressure to be elevated. 








- History of Current Complaint


Chief Complaint: EDHypertension


Time Seen by Provider: 05/04/18 06:07


Hx Obtained From: Patient, Family/Caretaker - wife


Pain Intensity: 0





- Allergies/Home Medications


Allergies/Adverse Reactions: 


 Allergies











Allergy/AdvReac Type Severity Reaction Status Date / Time


 


cholestyramine Allergy  Unknown Verified 05/04/18 05:52





   Reaction  





   Details  


 


colesevelam [From WelChol] Allergy  Diarrhea Verified 05/04/18 05:47


 


fenofibrate Allergy  Unknown Verified 05/04/18 05:54





   Reaction  





   Details  


 


gemfibrozil Allergy  Unknown Verified 05/04/18 05:54





   Reaction  





   Details  


 


mirabegron [From Myrbetriq] Allergy  Insomnia Verified 05/04/18 05:49


 


MS Atorvastatin Allergy  Unknown Verified 09/28/17 13:17





[From Lipitor]   Reaction  





   Details  


 


MS Gemfibrozil [Gemfibrozil] Allergy  Unknown Verified 09/28/17 13:17





   Reaction  





   Details  


 


niacin Allergy  Unknown Verified 05/04/18 05:50





   Reaction  





   Details  


 


oxycodone Allergy  Rash Verified 05/04/18 05:49


 


simvastatin [From Zocor] Allergy  Rash Verified 05/04/18 05:52


 


Statins-Hmg-Coa Reductase Allergy  Rash Verified 05/04/18 05:50





Inhibitor     


 


tamsulosin [From Flomax] Allergy  Unknown Verified 05/04/18 05:51





   Reaction  





   Details  


 


cat dander* Allergy Intermediate Sneezing Uncoded 09/28/17 13:17











Home Medications: 


 Home Medications





Alirocumab [Praluent] 75 mg SUBCUT .TWICE A MONTH 05/04/18 [History Confirmed 05 /04/18]


Calcium Carb/Magnesium Hydrox [Antacid 1000-200 mg Tab Chew] 1 tab PO WEEKLY 05/ 04/18 [History Confirmed 05/04/18]


Flaxseed Oil [Flaxseed Oil] 1,000 mg PO BID 05/04/18 [History Confirmed 05/04/18

]


Glucosa Avila 2Kcl/Chondroitin Avila [Glucosamine & Chondroitin Cap] 1 cap PO .FOUR 

TIMES A WEEK 05/04/18 [History Confirmed 05/04/18]


Lisinopril TAB* [Prinivil TAB*] 40 mg PO QAM 05/04/18 [History Confirmed 05/04/ 18]


Melatonin (NF) [Meladox] 3 mg PO BEDTIME PRN 05/04/18 [History Confirmed 05/04/ 18]


Metoprolol Succinate XL TAB* [Toprol XL TAB*] 25 mg PO QPM 05/04/18 [History 

Confirmed 05/04/18]


Minerin Cream* [Minerin*] 1 applic TOPICAL BID 05/04/18 [History Confirmed 05/04 /18]


Silodosin(NF) [Rapaflo(NF)] 8 mg PO DAILY 05/04/18 [History Confirmed 05/04/18]


Triamterene/HCTZ 37.5-25 MG* [Dyazide CAP*] 1 cap PO QAM 05/04/18 [History 

Confirmed 05/04/18]


Verapamil HCl [Verapamil ER] 120 mg PO QAM 05/04/18 [History Confirmed 05/04/18]


Verapamil HCl [Verapamil ER] 240 mg PO QPM 05/04/18 [History Confirmed 05/04/18]


amLODIPine TAB* [Norvasc 5 mg TAB*] 5 mg PO QAM 05/04/18 [History Confirmed 05/ 04/18]


busPIRone TAB* [Buspar TAB*] 5 mg PO .QD-TID PRN 05/04/18 [History Confirmed 05/ 04/18]











PMH/Surg Hx/FS Hx/Imm Hx


Previously Healthy: Yes


Endocrine/Hematology History: 


   Denies: Hx Anticoagulant Therapy, Hx Blood Disorders, Hx Diabetes, Hx 

Thyroid Disease, Hx Anemia, Hx Unexplained Bleeding, Hx Coagulopothy, 

Autoimmune Disease


Cardiovascular History: Reports: Hx Angina, Hx Hypercholesterolemia, Hx 

Hypertension - on medications, Other Cardiovascular Problems/Disorders - Ling 

event monitor placed in 2017; Sarah


   Denies: Hx Pacemaker/ICD


Respiratory History: Reports: Hx Asthma, Other Respiratory Problems/Disorders - 

allergies to fragrances - used topical steroids in past


   Denies: Hx Chronic Obstructive Pulmonary Disease (COPD)


GI History: Reports: Hx Gastroesophageal Reflux Disease - occassionally- OTC 

meds, Other GI Disorders - Hemmoriods and constipation


   Denies: Hx Ulcer


 History: Reports: Other  Problems/Disorders - prostate CA in 2015


   Denies: Hx Renal Disease


Musculoskeletal History: Reports: Hx Arthritis - left hand, right shoulder , 

knees, Hx Tendonitis - right arm- in 1970's


Sensory History: Reports: Hx Cataracts - both, Hx Contacts or Glasses


   Denies: Hx Hearing Aid


Opthamlomology History: Reports: Hx Cataracts - both, Hx Contacts or Glasses


Neurological History: Reports: Hx Transient Ischemic Attacks (TIA) - 2015


Psychiatric History: Reports: Hx Anxiety


   Denies: Hx Panic Disorder





- Cancer History


Cancer Type, Location and Year: prostate CA, 2015


Hx Chemotherapy: No - radiation treatment





- Surgical History


Surgery Procedure, Year, and Place: 1990's Deviated septum surgery;.  2012 Jan 

- Bone spur removal Left foot.  VASECTOMY.  colonoscopy w/snare polypectomy 

2015.  Ling event monitor implantation 2/2017


Hx Anesthesia Reactions: No





- Immunization History


Date of Tetanus Vaccine: utd


Date of Influenza Vaccine: utd


Infectious Disease History: No


Infectious Disease History: Reports: Hx Shingles - 2014


   Denies: Hx Clostridium Difficile, Hx Hepatitis, Hx Human Immunodeficiency 

Virus (HIV), Hx of Known/Suspected MRSA, Hx Tuberculosis, Hx Known/Suspected VRE

, Hx Known/Suspected VRSA, History Other Infectious Disease, Traveled Outside 

the [place] in Last 30 Days





- Family History


Known Family History: Positive: Cardiac Disease, Diabetes


Family History: Fhx of HLD





- Social History


Lives: With Family


Alcohol Use: Daily


Alcohol Amount: 2 oz. jarad a day


Hx Substance Use: No


Substance Use Type: Reports: None


Hx Tobacco Use: No


Smoking Status (MU): Never Smoked Tobacco





Review of Systems


Constitutional: Negative


Negative: Fever, Chills, Fatigue, Skin Diaphoresis


Eyes: Negative


Negative: Photophobia, Blurred Vision, Diplopia


ENT: Negative


Negative: Dental Pain


Cardiovascular: Negative


Negative: Palpitations, Chest Pain


Respiratory: Negative


Negative: Shortness Of Breath, Cough


Gastrointestinal: Negative


Positive: see HPI


Musculoskeletal: Negative


Positive: Rash


Neurological: Negative


Positive: Anxious


All Other Systems Reviewed And Are Negative: Yes





Physical Exam


Triage Information Reviewed: Yes


Vital Signs On Initial Exam: 


 Initial Vitals











Temp Pulse Resp BP Pulse Ox


 


 98.1 F   75   16   191/95   99 


 


 05/04/18 05:43  05/04/18 05:43  05/04/18 05:43  05/04/18 05:43  05/04/18 05:43











Vital Signs Reviewed: Yes


Appearance: Positive: Well-Appearing, No Pain Distress - anxious but does not 

appear to have physical pain, Well-Nourished


Skin: Positive: Warm, Skin Color Reflects Adequate Perfusion, Dry - scant areas 

of erythematous spots on forearms


Head/Face: Positive: Normal Head/Face Inspection


Eyes: Positive: Normal, EOMI, Conjunctiva Clear.  Negative: Conjunctiva 

Inflammed, Discharge


ENT: Positive: Normal ENT inspection, Hearing grossly normal, Pharynx normal - 

mucosa moist.  Negative: Nasal congestion, Nasal drainage


Neck: Positive: Supple, Nontender


Respiratory/Lung Sounds: Positive: Clear to Auscultation, Breath Sounds 

Present.  Negative: Rales, Rhonchi, Wheezes, Unable to speak in full sentences, 

Fatigue


Cardiovascular: Positive: Pulses are Symmetrical in both Upper and Lower 

Extremities, Bradycardia - intermittent (NOTE: pt takes beta blocker), Murmur, 

S1, S2.  Negative: Leg Edema Left, Leg Edema Right


Abdomen Description: Positive: Nontender, No Organomegaly, Soft


Bowel Sounds: Positive: Present


Musculoskeletal: Positive: Normal, Strength/ROM Intact, Other - pt ambulates 

independently and w/o difficulty


Neurological: Positive: Normal, Sensory/Motor Intact, Alert, Oriented to Person 

Place, Time, CN Intact II-III


Psychiatric: Positive: Anxious





Diagnostics





- Vital Signs


 Vital Signs











  Temp Pulse Resp BP Pulse Ox


 


 05/04/18 06:50   63  16  175/98  95


 


 05/04/18 05:43  98.1 F  75  16  191/95  99














- Laboratory


Result Diagrams: 


 05/04/18 08:20





 05/04/18 08:20


Lab Statement: Any lab studies that have been ordered have been reviewed, and 

results considered in the medical decision making process.





Re-Evaluation





- Re-Evaluation


  ** First Eval


Change: Improved - systolic BP improved over course of stay from 190 - 160; pt 

reports he feels relieved





Allergic Reaction Course/Dx





- Course


Course Of Treatment: Patient's assessment of increased systolic blood pressure 

noted this morning with routine check and onset of flushing do not appear to be 

the result of an acute life threatening pathology as per testing peformed here 

in ED. chest x-rays without acute findings.  Labs are within normal limits.  

EKG is bradycardia sinus with LVH and no ST elevations.  He was advised to 

refrain from any further Rapaflo as this could've caused a reaction for him.  

He will contact Dr. Briceno today to report his concerns as well as his success 

with urination as he's reported going 6 times since taking the medication and 

feels better from a urinary standpoint.  He will also touch base with Dr. Campos in the event that his blood pressure needs to be better controlled if 

it consistently remains elevated.  He will also refrain from using any scented 

or fragranced topical analgesics.  Review danger signs and symptoms with 

patient and wife prior to discharge.  They agree with plan.





- Diagnoses


Provider Diagnoses: 


 Hypertension








Discharge





- Sign-Out/Discharge


Documenting (check all that apply): Discharge/Admit/Transfer





- Discharge Plan


Condition: Stable


Disposition: HOME


Patient Education Materials:  Hypertension in the Older Adult (ED)


Referrals: 


Hal Hinojosa MD [Primary Care Provider] - 


August Briceno MD [Medical Doctor] - 


Additional Instructions: 


The cause of your increase in hypertension was not definitively identified 

today however any acute pathology that could be life-threatening was ruled out 

including heart attack, aortic dissection, renal dysfunction, infection, 

electrolyte abnormality.  Your blood pressure decreased over the time he were 

here and it was discussed that there may be an anxiety component.  You are 

encouraged to relax and avoid stimulants such as caffeine, chocolate, alcohol 

until further investigation through your PCP.  Call today for follow-up.





We also discussed a potential skin reaction to a menthol muscle rub you've been 

using as of late.  There is encouraged that you stop using this to see if her 

skin symptoms improve.  It is also known that steroids can increase her blood 

pressure however he reported using only a minimal amount recently - avoid using 

if possible to prevent further secondary issues.  He may use her other skin 

soothing techniques as directed by your dermatologist.





Furthermore, he reported taking Rapaflo last night to aid in your urinary 

retention.  Although this has helped urination, avoid taking any further 

medication until discussing with Dr. Briceno today as your symptoms could 

potentially be a reaction to this medication.  Her chart indicates an allergy 

to Flomax in the past however the details are not clear.  Call Dr. Briceno today 

for follow-up.





*If he developed chest pain, headache, change in vision, facial swelling, 

throat tightness, difficulty breathing or shortness of breath, nausea vomiting, 

diffuse sweating, weakness, fatigue, numbness, tingling, weakness, worsening of 

skin condition, inability to urinate, return to the emergency department.





- Billing Disposition and Condition


Condition: STABLE


Disposition: HOME

## 2018-07-14 ENCOUNTER — HOSPITAL ENCOUNTER (EMERGENCY)
Dept: HOSPITAL 25 - UCEAST | Age: 73
Discharge: HOME | End: 2018-07-14
Payer: MEDICARE

## 2018-07-14 VITALS — DIASTOLIC BLOOD PRESSURE: 66 MMHG | SYSTOLIC BLOOD PRESSURE: 120 MMHG

## 2018-07-14 DIAGNOSIS — R10.9: ICD-10-CM

## 2018-07-14 DIAGNOSIS — Z88.5: ICD-10-CM

## 2018-07-14 DIAGNOSIS — Z88.8: ICD-10-CM

## 2018-07-14 DIAGNOSIS — Z83.49: ICD-10-CM

## 2018-07-14 DIAGNOSIS — J45.909: ICD-10-CM

## 2018-07-14 DIAGNOSIS — Z85.46: ICD-10-CM

## 2018-07-14 DIAGNOSIS — Z82.49: ICD-10-CM

## 2018-07-14 DIAGNOSIS — Z83.3: ICD-10-CM

## 2018-07-14 DIAGNOSIS — R60.0: Primary | ICD-10-CM

## 2018-07-14 LAB
BASOPHILS # BLD AUTO: 0 10^3/UL (ref 0–0.2)
EOSINOPHIL # BLD AUTO: 0.1 10^3/UL (ref 0–0.6)
HCT VFR BLD AUTO: 39 % (ref 42–52)
HGB BLD-MCNC: 13.8 G/DL (ref 14–18)
LYMPHOCYTES # BLD AUTO: 1.3 10^3/UL (ref 1–4.8)
MCH RBC QN AUTO: 32 PG (ref 27–31)
MCHC RBC AUTO-ENTMCNC: 35 G/DL (ref 31–36)
MCV RBC AUTO: 91 FL (ref 80–94)
MONOCYTES # BLD AUTO: 0.5 10^3/UL (ref 0–0.8)
NEUTROPHILS # BLD AUTO: 2.5 10^3/UL (ref 1.5–7.7)
NRBC # BLD AUTO: 0 10^3/UL
NRBC BLD QL AUTO: 0
PLATELET # BLD AUTO: 218 10^3/UL (ref 150–450)
RBC # BLD AUTO: 4.32 10^6/UL (ref 4–5.4)
WBC # BLD AUTO: 4.4 10^3/UL (ref 3.5–10.8)

## 2018-07-14 PROCEDURE — 99211 OFF/OP EST MAY X REQ PHY/QHP: CPT

## 2018-07-14 PROCEDURE — G0463 HOSPITAL OUTPT CLINIC VISIT: HCPCS

## 2018-07-14 PROCEDURE — 83880 ASSAY OF NATRIURETIC PEPTIDE: CPT

## 2018-07-14 PROCEDURE — 80053 COMPREHEN METABOLIC PANEL: CPT

## 2018-07-14 PROCEDURE — 36415 COLL VENOUS BLD VENIPUNCTURE: CPT

## 2018-07-14 PROCEDURE — 85025 COMPLETE CBC W/AUTO DIFF WBC: CPT

## 2018-07-14 NOTE — UC
UC General HPI





- HPI Summary


HPI Summary: 


Pt is a 73 y/o M w/ c/o bilateral ankle edema and digestive problems. He does 

not think Sx are related and is most concerned about edema, which he states 

onset two weeks ago and has progressively worsened since. Pt denies pain in 

ankles. He describes his digestive system as feeling bloated and notes marginal 

pain, onsetting a couple of weeks ago. Pain is rated 1/10 on triage. He began 

using metamucil during this time and some time afterwards began taking 

antacids. He notes taking antacids more frequently recently. Pt had radiation 

therapy for prostate cancer which he states caused irritation and occasional 

bleeding in large intestine.








- History of Current Complaint


Chief Complaint: UCAbdominalPain


Stated Complaint: ANKLE SWELLING,ABD PAIN


Time Seen by Provider: 07/14/18 09:48


Hx Obtained From: Patient


Onset/Duration: Lasting Weeks - edema: two weeks, digestive problems: "a couple 

weeks ago"


Current Severity: Mild


Pain Intensity: 1


Pain Location at: abdomen 


Associated Signs & Symptoms: Positive: Abdominal Pain - "bloated" feeling, 

Edema - ankles





- Allergy/Home Medications


Allergies/Adverse Reactions: 


 Allergies











Allergy/AdvReac Type Severity Reaction Status Date / Time


 


cholestyramine Allergy  Unknown Verified 07/14/18 09:12





   Reaction  





   Details  


 


colesevelam [From WelChol] Allergy  Diarrhea Verified 07/14/18 09:12


 


fenofibrate Allergy  Unknown Verified 07/14/18 09:12





   Reaction  





   Details  


 


gemfibrozil Allergy  Unknown Verified 07/14/18 09:12





   Reaction  





   Details  


 


mirabegron [From Myrbetriq] Allergy  Insomnia Verified 07/14/18 09:12


 


niacin Allergy  Unknown Verified 07/14/18 09:12





   Reaction  





   Details  


 


oxycodone Allergy  Rash Verified 07/14/18 09:12


 


simvastatin [From Zocor] Allergy  Rash Verified 07/14/18 09:12


 


Statins-Hmg-Coa Reductase Allergy  Rash Verified 07/14/18 09:12





Inhibitor     


 


tamsulosin [From Flomax] Allergy  Unknown Verified 07/14/18 09:12





   Reaction  





   Details  


 


cat dander* Allergy Intermediate Sneezing Uncoded 09/28/17 13:17














PMH/Surg Hx/FS Hx/Imm Hx


Endocrine History: Other


Other Endocrine History: NEGATIVE: diabetes


Respiratory History: Asthma, Other


Other Respiratory History: NEGATIVE: COPD 


GI/ History: Other


Other GI/ History: NEGATIVE: ulcer 


Cancer History: Other


Other Cancer History: POSITIVE: prostate cancer 


Other History Of: 


   Negative For: HIV, Anticoagulant Therapy





- Surgical History


Surgical History: Yes


Surgery Procedure, Year, and Place: 1990's Deviated septum surgery;.  2012 Jan 

- Bone spur removal Left foot.  VASECTOMY.  colonoscopy w/snare polypectomy 

2015.  Ling event monitor implantation 2/2017





- Family History


Known Family History: Positive: Cardiac Disease, Diabetes


Family History: Fhx of HLD





- Social History


Alcohol Use: None


Alcohol Amount: stopped after starting medications


Substance Use Type: None


Smoking Status (MU): Never Smoked Tobacco





- Immunization History


Most Recent Influenza Vaccination: 2016/2017





Review of Systems


Gastrointestinal: Abdominal Pain - "bloated" feeling


Musculoskeletal: Edema - ankles


All Other Systems Reviewed And Are Negative: Yes





Physical Exam





- Summary


Physical Exam Summary: 





General: well-appearing, no pain distress


Skin: warm, color reflects adequate perfusion, dry


Head: normal


Eyes: EOMI, PANCHO


ENT: normal


Neck: supple, nontender


Respiratory: CTA, breath sounds present


Cardiovascular: RRR


Abdomen: soft, nontender, normal bowel sounds


Bowel: present


Musculoskeletal: normal, strength/ROM intact; mild bilateral pedal edema


Neurological: sensory/motor intact, A&O x3


Psychological: affect/mood appropriate





Triage Information Reviewed: Yes


Vital Signs: 


 Initial Vital Signs











Temp  97.8 F   07/14/18 09:04


 


Pulse  63   07/14/18 09:04


 


Resp  18   07/14/18 09:04


 


BP  120/66   07/14/18 09:04


 


Pulse Ox  99   07/14/18 09:04











Vital Signs Reviewed: Yes





Course/Dx





- Course


Course Of Treatment: MINIMAL PEDAL EDEMA.  ABDOMEN SOFT AND NON TENDER ON EXAM 

IN CLINIC, NO ABDOMINAL PAIN IN CLINIC.  WILL CHECK CBC, CMP, BNP.  F/U PMD; 

RECHECK SOONER IF WORSE.





- Differential Dx - Multi-Symptom


Provider Diagnoses: BILATERAL PEDAL EDEMA.  ABDOMINAL CRAMPING





Discharge





- Sign-Out/Discharge


Documenting (check all that apply): Patient Departure





- Discharge Plan


Condition: Stable


Disposition: HOME


Patient Education Materials:  Leg Edema (ED), Abdominal Pain (ED)


Referrals: 


Hal Hinojosa MD [Primary Care Provider] - 


Additional Instructions: 


FOLLOW UP WITH YOUR DOCTOR.


GET RECHECKED FOR ANY WORSENING OF YOUR CONDITION; SHORTNESS OF BREATH, YOU 

FEEL ILL OR QUESTIONS OR CONCERNS.





- Billing Disposition and Condition


Condition: STABLE


Disposition: Home

## 2019-02-26 NOTE — HP
*** AMENDED REPORT NOW INCLUDES COSIGNER DESIGNATION ***



PREOPERATIVE HISTORY AND PHYSICAL:

 

DATE OF ADMISSION/SURGERY:  03/08/19

 

DATE OF OFFICE VISIT/ENCOUNTER:  02/06/19

 

ATTENDING SURGEON:  Ruth Ann Golden MD.* (DICTATED BY SATYA LILLY)

 

PROCEDURE:  Left thumb carpometacarpal arthroplasty.

 

CHIEF COMPLAINT:  Left thumb pain.

 

PRIMARY CARE PHYSICIAN:  Dr. Hinojosa.

 

CARDIOLOGIST:  Dr. Smith.

 

HISTORY OF PRESENT ILLNESS:  This is a 73-year-old male who complains of pain 
at the base of his left thumb.  It has been ongoing for a few years now and was 
exacerbated in January 2017 when he had a fall.  X-rays have shown severe 
degenerative arthritis at the left thumb CMC joint.  He has had cortisone 
injection for this problem in the past and initially was doing well with that 
for pain relief, but the pain has become more persistent and the injection does 
not seem to be as helpful at this point.  He would now like to proceed with 
surgery.  He does have a cardiac history, is followed by Dr. Smith, and we 
will obtain medical clearance prior to proceeding with surgery.

 

PAST MEDICAL HISTORY:

1.  Hypertension.

2.  Heart murmur.

3.  Hyperlipidemia.

4.  Aortic insufficiency.

5.  Prostate cancer.

6.  History of TIA 3 years ago and 5 years ago.

7.  Anxiety.

 

PAST SURGICAL HISTORY:

1.  Heart monitor implant 3 years ago.

2.  Left great toe surgery.

3.  Cardiac catheterization in 2007.

4.  Radiation therapy for prostate cancer.

5.  Tonsillectomy.

6.  Bilateral cataract removal. CURRENT MEDICATIONS:

1.  Acetaminophen 325 mg 2 tablets q.6 hours p.r.n. pain.

2.  Amlodipine besylate 5 mg daily.

3.  Aspirin 81 mg daily.

4.  Buspirone HCL 5 mg 3 times a day.

5.  Calcium and vitamin D3 at 1200 mg once a week.

6.  Cardura 1 mg daily.

7.  Flaxseed oil 1000 mg twice a day.

8.  Klor-Con M10 at 10 mEq 2 twice a day.

9.  Lisinopril 20 mg daily.

10.  Magnesium 200 mg once a week with calcium.

11.  Melatonin 3 mg at night.

12.  Metamucil 2 heaped teaspoons daily.

13.  Metoprolol succinate ER 25 mg daily.

14.  Multivitamins once a week.

15.  Omeprazole 20 mg daily p.r.n.

16.  Praluent 75 mg/mL 1 injection every 2 weeks.

17.  Triamcinolone acetonide 0.1% apply topically to affected areas twice daily 
p.r.n.

18.  Triamterene/hydrochlorothiazide 37.5/25 half tab daily.

19.  Verapamil HCL  one tab in the morning and half tab at night.

 

ALLERGIES:  CHOLESTYRAMINE, FENOFIBRATE, FLOMAX, GEMFIBROZIL, LIPITOR, NIASPAN, 
PRAVASTATIN, and ZOCOR.

 

FAMILY MEDICAL HISTORY:  Noncontributory.

 

SOCIAL HISTORY:  Patient is retired.  He lives with his wife.  Denies tobacco 
use. No reactional drug use.  He drinks alcohol on rare occasion.

 

REVIEW OF SYSTEMS:  Negative for general, cephalic, cardiovascular, respiratory
, GI, , other musculoskeletal, integumentary, endocrine, neurologic and 
hematologic symptoms.  Infectious Disease:  Negative for MRSA, hepatitis C, HIV.

 

                               PHYSICAL EXAMINATION

 

GENERAL:  Well-developed, well-nourished 73-year-old male, in no acute distress.

 

VITAL SIGNS:  Height 5 feet 5 inches, weight 195 pounds.  Pulse rate 66, blood 
pressure 114/76.

 

HEENT:  Normocephalic, atraumatic.  Pupils are equal, round, and reactive to 
light and accommodation.  Extraocular movements are intact.  Throat is clear.

 

NECK:  Supple.  No palpable lymph nodes.

 

PULMONARY:  Lungs are clear to auscultation bilaterally.  No wheezes, rales, or 
rhonchi.

 

CARDIOVASCULAR:  Regular rate and rhythm.  S1, S2.  Moderate murmur detected on 
auscultation.  No rubs or gallops.  No edema.

 

ABDOMEN:  Positive bowel sounds.  Soft, nontender.

 

NEUROLOGICAL:  Alert and oriented x3.  Cranial nerves II through XII are 
intact. Sensation is intact to light touch.

 

MUSCULOSKELETAL:  On exam of his left hand, he has tenderness at the CMC joint 
of the thumb.  He also has tenderness over the ST-T joint region.  CMC grind 
test is positive.  He has full motion, but with pain.  There is no instability.
  Skin is intact.  Neurovascular function is intact.

 

 IMAGING STUDIES:  X-rays of the left thumb show moderate to advanced 
osteoarthritis of the first carpometacarpal joint.

 

IMPRESSION:  Left thumb carpometacarpal arthritis.

 

PLAN:  Patient is scheduled to undergo a left thumb carpometacarpal 
arthroplasty with Dr. Golden on 03/08/19.  He will return to the office 10 days 
postop for followup and suture removal.  A prescription for hydrocodone was e-
scribed to the patient's pharmacy for postoperative pain management.  We will 
obtain medical clearance prior to proceeding with surgery.

 

 ____________________________________ SATYA LILLY

 

[id number]/[facility] #: [id number]

ANNA

## 2019-03-06 NOTE — HP
*** AMENDED REPORT NOW INCLUDES DESIGNATED COSIGNER ***



PREOPERATIVE HISTORY AND PHYSICAL:

 

DATE OF ADMISSION/SURGERY:  03/08/19

 

DATE OF OFFICE VISIT/ENCOUNTER:  03/04/19

 

ATTENDING SURGEON:  Dr. Ruth Ann Golden.* (DICTATED BY SATYA LILLY)

 

PRIMARY CARE PHYSICIAN:  Dr. Hinojosa.

 

CARDIOLOGIST:  Dr. Smith.

 

PROCEDURE:  Left thumb carpometacarpal arthroplasty.

 

CHIEF COMPLAINT:  Left thumb pain.

 

HISTORY OF PRESENT ILLNESS:  This is a 73-year-old male who complains of pain 
at the base of his left thumb.  It has been ongoing for a few years now and was 
exacerbated in January 2017 when he had a fall.  X-rays have shown severe 
degenerative arthritis of the left thumb CMC joint.  He has had cortisone 
injection for this problem in the past and initially was doing well with that 
for pain relief, but the pain has become more persistent and the injection does 
not seem to be as effective at this point.  He has also failed physical 
therapy.  He would now like to proceed with surgery.  He has a cardiac history 
and is followed by Dr. Smith.  We will obtain medical clearance prior to 
proceeding with surgery.  The patient also reports some urine control problem 
secondary to history of prostate cancer.  He is planning on bringing Depends 
with him to where during surgery as needed.

 

PAST MEDICAL HISTORY:

1.  Hypertension.

2.  Heart murmur.

3.  Hyperlipidemia.

4.  Aortic insufficiency.

5.  Prostate cancer with radiation therapy.

6.  History of TIA x2, 3 years ago and 5 years ago.

7.  Anxiety.

 

PAST SURGICAL HISTORY:

1.  Heart monitor implant 3 years ago.

2.  Left great toe surgery.

3.  Cardiac catheterization in 2007 for radiation therapy for prostate cancer.

4.  Tonsillectomy.

5.  Bilateral cataract removal.

 

CURRENT MEDICATIONS:

1.  Acetaminophen 325 mg 2 tabs q.6 hours p.r.n. pain.

2.  Amlodipine besylate 5 mg daily.

3.  Aspirin 81 mg daily.

4.  Buspirone HCL 5 mg 3 times a day.

5.  Calcium and vitamin D3 1200 mg once a week.

6.  Cardura 1 mg daily.

7.  Flaxseed oil 1000 mg twice a day.

8.  Klor-Con M10 at 10 mEq 2 tabs twice daily.

9.  Lisinopril 20 mg daily.

10.  Magnesium 200 mg once a week with calcium.

11.  Melatonin 3 mg at night.

12.  Metamucil 2 heaped teaspoons daily.

13.  Metoprolol succinate ER 25 mg daily.

14.  Multivitamin once a week.

15.  Omeprazole 20 mg daily p.r.n.

16.  Praluent 75 mg/mL 1 injection every 2 weeks.

17.  Triamcinolone acetonide 0.1% applied topically to affected areas b.i.d. 
p.r.n.

18.  Triamterene/hydrochlorothiazide 37.5/25 half tab daily.

19.  Verapamil HCL  mg 1 tab in the morning and half tab at night.

 

ALLERGIES:  CHOLESTYRAMINE, FENOFIBRATE, FLOMAX, GEMFIBROZIL, LIPITOR, NIASPAN, 
PRAVASTATIN, AND ZOCOR.

 

FAMILY MEDICAL HISTORY:  Noncontributory.

 

SOCIAL HISTORY:  The patient is retired.  He lives with his wife.  He denies 
tobacco use.  No recreational drug use.  He drinks alcohol on rare occasion.

 

REVIEW OF SYSTEMS:  Negative for general, cephalic, cardiovascular, respiratory
, GI, other musculoskeletal, integumentary, endocrine, neurologic and 
hematologic symptoms.   is positive for urine control problem and then 
infectious disease is negative for MRSA, hepatitis C, and HIV.

 

                               PHYSICAL EXAMINATION

 

GENERAL:  A well-developed, well-nourished 73-year-old male in no acute 
distress.

 

VITAL SIGNS:  Height 5 feet 5 inches, weight 190 pounds, pulse rate 70, blood 
pressure 110/70.

 

HEENT:  Normocephalic, atraumatic.  Pupils are equal, round, and reactive to 
light and accommodation.  Extraocular movements are intact.  Throat is clear.

 

NECK:  Supple.  No palpable lymph nodes.

 

PULMONARY:  Lungs are clear to auscultation bilaterally.  No wheezes, rales, or 
rhonchi.

 

CARDIOVASCULAR:  Regular rate and rhythm.  S1 and S2.  Moderate murmur detected 
on auscultation.  No rubs or gallops.  No edema.

 

ABDOMEN:  Positive bowel sounds, soft and nontender.

 

NEUROLOGICAL:  Alert and oriented x3.  Cranial nerves II through XII are 
intact. Sensation is intact to light touch.

 

MUSCULOSKELETAL:  On exam of his left hand, he has tenderness at the CMC joint 
of the thumb.  He also has tenderness over the ST-T joint region.  CMC grind 
test is positive.  He has full motion, but with pain.  There is no instability. 
Neurovascular function is intact.

 

SKIN:  Skin is  intact.

 

 IMAGING STUDIES:  X-rays of the left thumb show moderate to advanced 
osteoarthritis of the first carpometacarpal joint.

 

IMPRESSION:  Left thumb carpometacarpal arthritis.

 

PLAN:  The patient is scheduled to undergo left thumb carpometacarpal 
arthroplasty with Dr. Golden on 03/08/19.  He will return to the office 10 days 
postop for followup and suture removal.  A prescription for hydrocodone was e-
scribed to the patient's pharmacy for postoperative pain management.  We will 
obtain medical clearance prior to proceeding with surgery.

 

 ____________________________________ SATYA LILLY

 

[id number]/[facility] #: [id number]

ANNA

## 2019-03-08 ENCOUNTER — HOSPITAL ENCOUNTER (OUTPATIENT)
Dept: HOSPITAL 25 - OR | Age: 74
Discharge: HOME | End: 2019-03-08
Attending: ORTHOPAEDIC SURGERY
Payer: MEDICARE

## 2019-03-08 VITALS — SYSTOLIC BLOOD PRESSURE: 108 MMHG | DIASTOLIC BLOOD PRESSURE: 57 MMHG

## 2019-03-08 DIAGNOSIS — M18.12: Primary | ICD-10-CM

## 2019-03-08 DIAGNOSIS — Z86.73: ICD-10-CM

## 2019-03-08 DIAGNOSIS — R01.1: ICD-10-CM

## 2019-03-08 DIAGNOSIS — I35.1: ICD-10-CM

## 2019-03-08 DIAGNOSIS — E78.5: ICD-10-CM

## 2019-03-08 DIAGNOSIS — I10: ICD-10-CM

## 2019-03-08 DIAGNOSIS — J45.909: ICD-10-CM

## 2019-03-08 DIAGNOSIS — Z85.46: ICD-10-CM

## 2019-03-08 PROCEDURE — 88304 TISSUE EXAM BY PATHOLOGIST: CPT

## 2019-03-08 PROCEDURE — 88311 DECALCIFY TISSUE: CPT

## 2019-03-08 NOTE — OP
CC:  Dr. Golden.

 

OPERATIVE REPORT:

 

DATE OF OPERATION:  03/08/19

 

YOB: 1945

 

SURGEON:  Ruth Ann Golden MD.

 

ASSISTANT:  SATYA Yoon.

 

ANESTHESIA:  General.

 

PRE-OP DIAGNOSIS:  Carpometacarpal arthritis, left thumb.

 

POST-OP DIAGNOSIS:  Carpometacarpal arthritis, left thumb.

 

PROCEDURE PERFORMED:  Left thumb metacarpal arthroplasty.

 

ESTIMATED BLOOD LOSS:  Zero.

 

TOURNIQUET TIME:  About 30 minutes.

 

INDICATIONS FOR PROCEDURE:  Mario is a 73-year-old man who has painful arthritis at the base of his lef
t thumb.  He has failed conservative treatment and presents now for arthroplasty.

 

DESCRIPTION OF PROCEDURE:  The patient was brought to the operating room and was given a general anes
thetic and placed in supine position on the operating table. The tourniquet was around his left upper
 arm.  The skin to the left upper extremity was prepped and draped in the usual sterile fashion.  The
 hand and forearm were exsanguinated, the tourniquet elevated to 250 mmHg.  An S-shaped incision was 
made centered at the thumb and CMC joint.  We dissected bluntly through the subcutaneous tissue.  Bra
nches of the radial sensory nerves were located and retracted by the surgical assistant, Maite persaud.  The radial artery was  then located and also retracted by the surgical assistant.  A distally ba
sed  U-shaped flap was created over the thumb CMC joint and subperiosteally dissected off the trapezi
um.  The trapezium was then removed in its entirety.  The wound was irrigated and the CMC joint capsu
le was secured to the FCR tendon with 4-0 nylon suture.  The remainder of the capsule was closed in a
n interrupted fashion with 4-0 nylon suture to stabilize position of the MP joint in about 30 degrees
 of flexion.  The wound was again was irrigated and the skin edges reapproximated with 4-0 nylon sutu
re.  The wound was dressed with Xeroform, 4x4s, Webril, and thumb spica splint with the metacarpal ab
ducted.  The patient tolerated the procedure well and was brought to the recovery room in good condit
ion.

 

 [id number]/[facility] #: [id number]

## 2019-08-07 ENCOUNTER — HOSPITAL ENCOUNTER (EMERGENCY)
Dept: HOSPITAL 25 - UCEAST | Age: 74
Discharge: HOME | End: 2019-08-07
Payer: MEDICARE

## 2019-08-07 VITALS — DIASTOLIC BLOOD PRESSURE: 85 MMHG | SYSTOLIC BLOOD PRESSURE: 168 MMHG

## 2019-08-07 DIAGNOSIS — E78.5: ICD-10-CM

## 2019-08-07 DIAGNOSIS — F41.9: ICD-10-CM

## 2019-08-07 DIAGNOSIS — Z86.73: ICD-10-CM

## 2019-08-07 DIAGNOSIS — S61.215A: Primary | ICD-10-CM

## 2019-08-07 DIAGNOSIS — I10: ICD-10-CM
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
## 2019-08-07 DIAGNOSIS — Z88.5: ICD-10-CM

## 2019-08-07 PROCEDURE — 99212 OFFICE O/P EST SF 10 MIN: CPT

## 2019-08-07 PROCEDURE — G0463 HOSPITAL OUTPT CLINIC VISIT: HCPCS

## 2019-08-07 NOTE — XMS REPORT
Continuity of Care Document (CCD)

 Created on:2019



Patient:Desiree Omer

Sex:Male

:1945

External Reference #:[id number]





Demographics







 Address  [address]  [address]



   [place] [address]

 

 Mobile Phone  [phone]

 

 Preferred Language  en

 

 Marital Status  Not  or 

 

 Jehovah's witness Affiliation  Unknown

 

 Race  White

 

 Ethnic Group  Not  or 









Author







 Name  Jean Claude Davis, DO

 

 Address  [address]



   Unavailable



   [place] [address]









Care Team Providers







 Name  Role  Phone

 

 Kellie Mcmanus PA  Care Team Information   Unavailable

 

 Hal Hinojosa MD  Primary Care Physician  Unavailable









Payers







 Date  Identification Numbers  Payment Provider  Subscriber

 

   Policy Number: [id number]  Medicare  Desiree Jacksonley









 PayID: [id number]  [facility]









 [address]

 

 [facility] [place] [address]









   Policy Number: [id number]  [facility]  Desiree MARU Omer









 PayID: [id number]  Claims, [address]









 [place] [address]









 Expires: 2019  Policy Number: [id number]  [facility]  Desiree MARU Omer









 PayID: [id number]  [address] [place] [address]







Problems







 Active Problems  Provider  Date

 

 Pure hypercholesterolemia  BRIDGETTE Carrizales  Onset: 2015

 

 Essential hypertension  BRIDGETTE Carrizales  Onset: 2015

 

 Gastroesophageal reflux disease  BRIDGETTE Carrizales  Onset: 2015

 

 Screening for malignant neoplasm of colon  BRIDGETTE Carrizales  Onset: 

 

 Carcinoma in situ of prostate  BRIDGETTE Carrizales  Onset: 2015







Social History







 Type  Date  Description  Comments

 

 Birth Sex    Unknown  

 

 Tobacco Use  Start: Unknown End: Unknown  Patient is a former smoker  

 

 Smoking Status  Reviewed: 19  Patient is a former smoker  







Allergies, Adverse Reactions, Alerts







 Active Allergies  Reaction  Severity  Comments  Date

 

 Statins        2015

 

 Fenofibrate        2019

 

 Flonase        2015

 

 Oxycodone        2019

 

 Rapaflo  High Blood Pressure      2019

 

 Simvastatin  Skin Rash      2019

 

 Colesevelam        2019







Medications







 Active Medications  SIG  Qnty  Indications  Ordering  Date



         Provider  

 

 Peg 3350/Electrolytes  use as directed  4000ml    Jean Claude RyanDO  2019



           



 240gm Solution Rec          



           

 

 Buspirone HCL  Take 1/2 Tablet  45tabs    Hal Hinojosa,  2019



             10mg  By Mouth Three      MD  



 Tablets  Times Daily        



           

 

 Triamterene/Hydrochlo  take 1/2 tablet  45tabs    Hal Hinojosa,  2019



 rothiazide  by mouth every      MD  



          37.5-25mg  day        



 Tablets          



           

 

 Losartan Potassium  Take 1 Tablet By  60tabs    Hal Hinojosa,  2015



                  50mg  Mouth One Time      MD  



 Tablets  Daily        



           

 

 Lisinopril  Take 2 Tablets By  180tabs    Munira Swanson NP  2012



          20mg Tablets  Mouth Every Day        



   as Directed        

 

 Verapamil HCL ER  1 tablet Am and  90tabs    Munira Swanson NP  2012



                240mg  1/2 tablet PM        



 Tablets ER          



           

 

 Klor-Con M20  1 by mouth twice  90tabs    Hal Hinojosa,  2009



            20Meq  daily      MD  



 Tablets ER          



           

 

 Metoprolol Succinate  1 by mouth every      Unknown  



 ER  day        



  25mg Tablets ER 24HR          



           

 

 Aspirin Adult Low  1 by mouth every      Unknown  



 Dose  day        



    81mg Tablets DR          



           

 

 Praluent  inject every two      Unknown  



        75mg/ml  weeks        



 Solution Pen-Inject          



           

 

 Amlodipine Besylate  1 by mouth every      Unknown  



                   5mg  day        



 Tablets          



           

 

 Alprazolam  take one tablet  30tabs    Hal Hinojosa,  



          0.5mg  by [id number] as      MD  



 Tablets  needed for        



   anxiety        

 

 Myrbetriq  1 by mouth every      Unknown  



         50mg Tablets  in the evening        



 ER 24HR          



           

 

 Cardura  1 tab qd      Unknown  



       1mg Tablets          



           

 

 Fenofibrate        Unknown  



           145mg          



 Tablets          



           

 

 PatNoel Gillespie MD  



       0.1% Solution          



           

 

 Naproxen        Unknown  



        500mg Tablets          



           

 

 Valacyclovir HCL        Unknown  



                1gm          



 Tablets          



           

 

 Prednisone        Unknown  



          5mg Tablets          



           

 

 Tamsulosin HCL        Kaity,  



              0.4mg        MD August  



 Capsules          



           

 

 Clonazepam        Hal Hinojosa,  



          0.5mg        MD  



 Tablets          



           

 

 Alfuzosin HCL ER        Kaity,  



                10mg        MD August  



 Tablets ER 24HR          



           









 History Medications









 Colyte With Flavor  as directed  4000ml    Suresh NAVJOTMiriam Barrow  2015 -



 Packs        MD  2019



     227.1gm Solution          



 Rec          

 

 Colyte-Flavor Packs  As directed  1units  233.4  Tasha Patinodipak,  2015 
-



                   4L        FNP-C  2015



 Solution Rec          



           

 

 Triamterene/Hydrochlo        Hal Hinojosa,   -



 rothiazide        MD  2019



          37.5-25mg          



 Tablets          



           

 

 Losartan Potassium        Maghaydah,   -



                  50mg        Qutaybeh, MD  2019



 Tablets          



           

 

 Lisinopril        Hal Hinojosa,   -



          20mg Tablets        MD  2019



           

 

 Verapamil HCL ER        Noel Morales MD   -



                240mg          2019



 Tablets ER          



           

 

 Klor-Con M20        Hal Hinojosa,   -



            20Meq        MD  2019



 Tablets ER          



           

 

 Oxycodone-Acetaminoph        Unknown   -



 en          2019



  5-325mg Tablets          



           







Vital Signs







 Date  Vital  Result  Comment

 

 2019 10:30am  Height  65 inches  5'5"









 Weight  194.00 lb  

 

 BP Systolic  138 mmHg  

 

 BP Diastolic  72 mmHg  

 

 Heart Rate  56 /min  

 

 BMI (Body Mass Index)  32.3 kg/m2  









 2015  2:58pm  Height  64 inches  5'4"









 Weight  194.00 lb  

 

 BP Systolic  128 mmHg  

 

 BP Diastolic  68 mmHg  

 

 Heart Rate  82 /min  

 

 BMI (Body Mass Index)  33.3 kg/m2  







Results







 Test  Date  Facility  Test  Result  H/L  Range  Note

 

 Laboratory test  2019  [facility]  Surgical  SEE RESULT      1, 2



 finding      Pathology  BELOW      

 

 CBC Auto Diff  2019  N2N/CCD Import  White Blood  5.7 10^3/uL    3.5-
10.8  



       Count        









 Red Blood Count  4.59 10^6/uL    4.18-5.48  

 

 Hemoglobin  14.5 g/dL    14-18  

 

 Hematocrit  42 %    42-52  

 

 Mean Corpuscular Volume  92 fL    80-94  

 

 Mean Corpuscular Hemoglobin  32 pg  High  27-31  

 

 Mean Corpuscular HGB Conc  35 g/dL    31-36  

 

 Red Cell Distribution Width  13 %    10.5-15  

 

 Platelet Count  216 10^3/uL    150-450  

 

 Mean Platelet Volume  8.1 fL    7.4-10.4  

 

 Abs Neutrophils  3.4 10^3/uL    1.5-7.7  

 

 Abs Lymphocytes  1.5 10^3/uL    1-4.8  

 

 Abs Monocytes  0.6 10^3/uL    0-0.8  

 

 Abs Eosinophils  0.1 10^3/uL    0-0.6  

 

 Abs Basophils  0.0 10^3/uL    0-0.2  

 

 Abs Nucleated RBC  0.0 10^3/uL      

 

 Granulocyte %  59.9 %      

 

 Lymphocyte %  26.8 %      

 

 Monocyte %  10.5 %      

 

 Eosinophil %  2.1 %      

 

 Basophil %  0.7 %      

 

 Nucleated Red Blood Cells %  0.1 1      









 Inr/Protime  2019  N2N/CCD Import  Inr  0.89 1    0.82-1.09  3

 

 Lab Results  2019  N2N/CCD Import  Partial Thrombo  28.4 s    26-36.3  



       Time PTT        

 

 Comp Metabolic  2019  N2N/CCD Import  Sodium  138 mmol/L    135-145  



 Panel              









 Potassium  3.8 mmol/L    3.5-5  

 

 Chloride  107 mmol/L    101-111  

 

 Co2 Carbon Dioxide  25 mmol/L    22-32  

 

 Anion Gap  6 mmol/L    2-11  

 

 Glucose  106 mg/dL  High    

 

 Blood Urea Nitrogen  10 mg/dL    6-24  

 

 Creatinine  0.81 mg/dL    0.67-1.17  

 

 BUN/Creatinine Ratio  12.3 1    8-20  

 

 Calcium  8.9 mg/dL    8.6-10.3  

 

 Total Protein  6.5 g/dL    6.4-8.9  

 

 Albumin  4.2 g/dL    3.2-5.2  

 

 Globulin  2.3 g/dL    2-4  

 

 Albumin/Globulin Ratio  1.8 1    1-3  

 

 Total Bilirubin  0.70 mg/dL    0.2-1  

 

 Alkaline Phosphatase  62 U/L      

 

 Alt  15 U/L    7-52  

 

 Ast  18 U/L    13-39  

 

 Egfr Non-  92.9 1      

 

 Egfr   112.4 1      4









 Lab Results  2019  N2N/CCD Import  Magnesium  2.2 mg/dL    1.9-2.7  









 Troponin I  0.00 ng/mL      5

 

 TSH (Thyroid Stim Horm)  2.75 mcIU/mL    0.34-5.6  









 Lab Results  2019  N2N/CCD Import  Magnesium  2.2 mg/dL    1.9-2.7  









 Troponin I  0.00 ng/mL      6

 

 TSH (Thyroid Stim Horm)  3.37 mcIU/mL    0.34-5.6  









 Comp Metabolic Panel  2019  N2N/CCD Import  Sodium  141 mmol/L    135-
145  









 Potassium  3.6 mmol/L    3.5-5  

 

 Chloride  108 mmol/L    101-111  

 

 Co2 Carbon Dioxide  26 mmol/L    22-32  

 

 Anion Gap  7 mmol/L    2-11  

 

 Glucose  101 mg/dL  High    

 

 Blood Urea Nitrogen  11 mg/dL    6-24  

 

 Creatinine  0.79 mg/dL    0.67-1.17  

 

 BUN/Creatinine Ratio  13.9 1    8-20  

 

 Calcium  8.8 mg/dL    8.6-10.3  

 

 Total Protein  6.1 g/dL  Low  6.4-8.9  

 

 Albumin  4.0 g/dL    3.2-5.2  

 

 Globulin  2.1 g/dL    2-4  

 

 Albumin/Globulin Ratio  1.9 1    1-3  

 

 Total Bilirubin  0.60 mg/dL    0.2-1  

 

 Alkaline Phosphatase  56 U/L      

 

 Alt  15 U/L    7-52  

 

 Ast  16 U/L    13-39  

 

 Egfr Non-  95.6 1      

 

 Egfr   115.7 1      7









 Lab Results  2019  N2N/[employer] Import  Lactic Acid  1.2 mmol/L    0.5-2  8

 

 CBC Auto Diff  2019  N2N/CCD Import  White Blood Count  6.1 10^3/uL    
3.5-10.8  









 Red Blood Count  4.30 10^6/uL    4.18-5.48  

 

 Hemoglobin  13.4 g/dL  Low  14-18  

 

 Hematocrit  39 %    36-46  

 

 Mean Corpuscular Volume  91 fL    80-94  

 

 Mean Corpuscular Hemoglobin  31 pg    27-31  

 

 Mean Corpuscular HGB Conc  34 g/dL    31-36  

 

 Red Cell Distribution Width  14 %    10.5-15  

 

 Platelet Count  198 10^3/uL    150-450  

 

 Mean Platelet Volume  8.1 fL    7.4-10.4  

 

 Abs Neutrophils  4.0 10^3/uL    1.5-7.7  

 

 Abs Lymphocytes  1.3 10^3/uL    1-4.8  

 

 Abs Monocytes  0.6 10^3/uL    0-0.8  

 

 Abs Eosinophils  0.2 10^3/uL    0-0.6  

 

 Abs Basophils  0 10^3/uL    0-0.2  

 

 Abs Nucleated RBC  0 10^3/uL      

 

 Granulocyte %  65.6 %      

 

 Lymphocyte %  21.1 %      

 

 Monocyte %  10.0 %      

 

 Eosinophil %  2.6 %      

 

 Basophil %  0.7 %      

 

 Nucleated Red Blood Cells %  0 1      









 CBC Auto Diff  03/10/2019  [facility] Import  White Blood Count  6.8 10^3/uL    
3.5-10.8  









 Red Blood Count  4.24 10^6/uL    4-5.4  

 

 Hemoglobin  13.2 g/dL  Low  14-18  

 

 Hematocrit  39 %  Low  42-52  

 

 Mean Corpuscular Volume  92 fL    80-94  

 

 Mean Corpuscular Hemoglobin  31 pg    27-31  

 

 Mean Corpuscular HGB Conc  34 g/dL    31-36  

 

 Red Cell Distribution Width  14 %    10.5-15  

 

 Platelet Count  195 10^3/uL    150-450  

 

 Mean Platelet Volume  8.3 fL    7.4-10.4  

 

 Abs Neutrophils  4.3 10^3/uL    1.5-7.7  

 

 Abs Lymphocytes  1.4 10^3/uL    1-4.8  

 

 Abs Monocytes  0.7 10^3/uL    0-0.8  

 

 Abs Eosinophils  0.3 10^3/uL    0-0.6  

 

 Abs Basophils  0.1 10^3/uL    0-0.2  

 

 Abs Nucleated RBC  0 10^3/uL      

 

 Granulocyte %  64.0 %      

 

 Lymphocyte %  21.3 %      

 

 Monocyte %  10.2 %      

 

 Eosinophil %  3.8 %      

 

 Basophil %  0.7 %      

 

 Nucleated Red Blood Cells %  0.1 1      









 Inr/Protime  03/10/2019  [employer] Import  Inr  0.85 1    0.77-1.02  

 

 Lab Results  03/10/2019  [employer] Import  Partial  25.4 s  Low  26-36.3  



       Thrombo Time        



       PTT        

 

 Comp Metabolic  03/10/2019  [employer] Import  Sodium  137 mmol/L    135-145  



 Panel              









 Potassium  3.9 mmol/L    3.5-5  

 

 Chloride  105 mmol/L    101-111  

 

 Co2 Carbon Dioxide  27 mmol/L    22-32  

 

 Anion Gap  5 mmol/L    2-11  

 

 Glucose  93 mg/dL      

 

 Blood Urea Nitrogen  14 mg/dL    6-24  

 

 Creatinine  0.82 mg/dL    0.67-1.17  

 

 BUN/Creatinine Ratio  17.1 1    8-20  

 

 Calcium  8.5 mg/dL  Low  8.6-10.3  

 

 Total Protein  6.1 g/dL  Low  6.4-8.9  

 

 Albumin  3.8 g/dL    3.2-5.2  

 

 Globulin  2.3 g/dL    2-4  

 

 Albumin/Globulin Ratio  1.7 1    1-3  

 

 Total Bilirubin  0.50 mg/dL    0.2-1  

 

 Alkaline Phosphatase  49 U/L      

 

 Alt  13 U/L    7-52  

 

 Ast  19 U/L    13-39  

 

 Egfr Non-  91.6 1      

 

 Egfr   110.8 1      9









 Laboratory test  05/15/2015  [facility]  Surgical Interface  SEE RESULT      10



 finding      Order  BELOW      

 

 Xray  2015  [facility] Radiology  NM Bone Scan -  <pending>      



       Total Body        









 1  [id number]

 

 2  SEE RESULT BELOW



   -----------------------------------------------------------------------------
---------------



   Name:  DESIREE OMER                    : 1945    Attend Dr: Jean Claude Davis DO



   Acct:  [id number]  Unit: [id number]  AGE: 73            Location:  [facility]



   Re19                        SEX: M             Status:    DEP REF



   -----------------------------------------------------------------------------
---------------



   



   SPEC: [id number]             RADHA:       [facility] DR: Jean Claude Davis DO



   REQ:  [id number]             RECD: 



   STATUS: JASON DELGADO DR: Hal Briceno MD



   _



   ORDERED:  LEVEL 4



   COMMENTS: [id number]



   



   FINAL DIAGNOSIS



   



   



   Colon, rectum, biopsy:



   -- Hyperplastic polyp.



   



   



   -----------------------------------------------------------------------------
---------------



   



   



   



   POST-OPERATIVE DIAGNOSIS



   



   Colonoscopy: to cecum; very tortuous; good prep; arteriovenous malformations
; rectal



   bleeding; external and internal hemorrhoids; biopsy polypectomy rectum



   



   



   GROSS DESCRIPTION



   



   The specimen is received in formalin labeled, Biopsy Rectal Polyp, and 
consists of a 0.4 x



   0.2 x 0.2 cm tan-pink polypoid soft tissue fragment which is submitted 
entirely in one



   cassette.



   



   Signed by and Reported on: __________              Cody Dodson MD  1305



   



   



   -----------------------------------------------------------------------------
---------------



   



   



   



   



   



   



   



   



   ** END OF REPORT **



   



   DEPARTMENT OF PATHOLOGY,  [address] [place] [address]



   Phone # [phone]      Fax #[phone]



   Cody Dodson M.D. Director     [facility] # [id number]



   



   SEE RESULT BELOW



   -----------------------------------------------------------------------------
---------------



   Name:  DESIREE OMER                    : 1945    Attend Dr: Jean Claude Davis DO



   Acct:  [id number]  Unit: [id number]  AGE: 73            Location:  [facility]



   Re19                        SEX: M             Status:    DEP REF



   -----------------------------------------------------------------------------
---------------



   



   SPEC: [id number]             RADHA: 4      [facility] DR: Jean Claude Davis DO



   REQ:  [id number]             RECD: 



   STATUS: JASON DELGADO DR: Hal Briceno MD



   _



   ORDERED:  LEVEL 4



   COMMENTS: [id number]



   



   FINAL DIAGNOSIS



   



   



   Colon, rectum, biopsy:



   -- Hyperplastic polyp.



   



   



   -----------------------------------------------------------------------------
---------------



   



   



   



   POST-OPERATIVE DIAGNOSIS



   



   Colonoscopy: to cecum; very tortuous; good prep; arteriovenous malformations
; rectal



   bleeding; external and internal hemorrhoids; biopsy polypectomy rectum



   



   



   GROSS DESCRIPTION



   



   The specimen is received in formalin labeled, Biopsy Rectal Polyp, and 
consists of a 0.4 x



   0.2 x 0.2 cm tan-pink polypoid soft tissue fragment which is submitted 
entirely in one



   cassette.



   



   Signed by and Reported on: __________              Cody Dodson MD  1352



   



   



   -----------------------------------------------------------------------------
---------------



   



   



   



   



   



   



   



   



   ** END OF REPORT **



   



   DEPARTMENT OF PATHOLOGY,  [address] [place] [address]



   Phone # [phone]      Fax #[phone]



   Cody Dodson M.D. Director     [facility] # [id number]

 

 3  Standard intensity warfarin therapeutic range: 2.0-3.0



   High intensity warfarin therapeutic range: 2.5-3.5

 

 4  *******Because ethnic data is not always readily available,



   this report includes an eGFR for both -Americans and



   non- Americans.****



   The National Kidney Disease Education Program (NKDEP) does



   not endorse the use of the MDRD equation for patients that



   are not between the ages of 18 and 70, are pregnant, have



   extremes of body size, muscle mass, or nutritional status,



   or are non- or non-.



   According to the National Kidney Foundation, irrespective of



   diagnosis, the stage of the disease is based on the level of



   kidney function:



   Stage Description                      GFR(mL/min/1.73 m(2))



   1     Kidney damage with normal or decreased GFR       90



   2     Kidney damage with mild decrease in GFR          60-89



   3     Moderate decrease in GFR                         30-59



   4     Severe decrease in GFR                           15-29



   5     Kidney failure                       <15 (or dialysis)

 

 5  Troponin-I testing on Plasma Separator Tubes (PST) has a



   known false positive rate of 0.20-0.40%.  All positive



   troponins reflex immediately to secondary confirmatory



   testing.



   



   Using the [employer] DxI 800 Access Immunoassay systems, the



   99th percentile upper reference limit was demonstrated to be



   < 0.03 ng/mL.

 

 6  Troponin-I testing on Plasma Separator Tubes (PST) has a



   known false positive rate of 0.20-0.40%.  All positive



   troponins reflex immediate secondary confirmatory testing.

 

 7  *******Because ethnic data is not always readily available,



   this report includes an eGFR for both -Americans and



   non- Americans.****



   The National Kidney Disease Education Program (NKDEP) does



   not endorse the use of the MDRD equation for patients that



   are not between the ages of 18 and 70, are pregnant, have



   extremes of body size, muscle mass, or nutritional status,



   or are non- or non-.



   According to the National Kidney Foundation, irrespective of



   diagnosis, the stage of the disease is based on the level of



   kidney function:



   Stage Description                      GFR(mL/min/1.73 m(2))



   1     Kidney damage with normal or decreased GFR       90



   2     Kidney damage with mild decrease in GFR          60-89



   3     Moderate decrease in GFR                         30-59



   4     Severe decrease in GFR                           15-29



   5     Kidney failure                       <15 (or dialysis)

 

 8  [facility] Severe Sepsis and Septic Shock Management Bundle Measure



   requires all lactic acids initially measuring >2.0 mmol/L be



   repeated.

 

 9  *******Because ethnic data is not always readily available,



   this report includes an eGFR for both -Americans and



   non- Americans.****



   The National Kidney Disease Education Program (NKDEP) does



   not endorse the use of the MDRD equation for patients that



   are not between the ages of 18 and 70, are pregnant, have



   extremes of body size, muscle mass, or nutritional status,



   or are non- or non-.



   According to the National Kidney Foundation, irrespective of



   diagnosis, the stage of the disease is based on the level of



   kidney function:



   Stage Description                      GFR(mL/min/1.73 m(2))



   1     Kidney damage with normal or decreased GFR       90



   2     Kidney damage with mild decrease in GFR          60-89



   3     Moderate decrease in GFR                         30-59



   4     Severe decrease in GFR                           15-29



   5     Kidney failure                       <15 (or dialysis)

 

 10  SEE RESULT BELOW



   -----------------------------------------------------------------------------
---------------



   Name:  DESIREE OMER                    : 1945    Attend Dr: Suresh Barrow MD



   Acct:  [id number]  Unit: [id number]  AGE: 69            Location:  [facility]



   Re/15/15                        SEX: M             Status:    REG REF



   -----------------------------------------------------------------------------
---------------



   



   SPEC: [id number]             RADHA: 05/15/      [facility] DR: Suresh Barrow MD



   REQ:  [id number]             RECD: 05/15/



   STATUS: JASON DELGADO DR: Hal Hinojosa MD



   _



   ORDERED:  LEVEL IV/2



   



   FINAL DIAGNOSIS



   



   



   1.  Colon, right, biopsy:



   -- Sessile serrated adenoma.



   -- No high-grade dysplasia identified.



   



   2.   Colon, 35 cm, biopsy:



   -- Tubular adenoma.



   -- No high grade dysplasia or malignancy.



   



   



   



   



   



   CLINICAL HISTORY



   



   Screening colonoscopy



   



   POST-OPERATIVE DIAGNOSIS



   



   Screening colonoscopy into terminal ileum, prep good.  Three polyps removed 
with cold snare.



   Conclusion: Three polyps removed.



   



   GROSS DESCRIPTION



   



   1.   The specimen is received in formalin labeled, Right Colon Polyp, and 
consists of a 0.8



   x 0.7 x 0.2 cm aggregate of multiple tan-pink irregular soft tissue fragments
, which is



   submitted entirely in one cassette.



   



   2.   The specimen is received in formalin labeled, Colon Polyps at 35 cm, 
and consists of



   three tan white irregular to polypoid soft tissue fragments ranging from 0.2 
x 0.1 cm by



   less than 0.1 cm to 0.3 x 0.3 x 0.3 cm, which are submitted entirely in one 
cassette.



   



   Signed __________(signature on file)___________ Cody Dodsno MD 05/18/
15 1328



   



   -----------------------------------------------------------------------------
---------------



   



   ** END OF REPORT **



   



   * ML=Testing performed at Main Lab



   DEPARTMENT OF PATHOLOGY,  [address] [place] [address]



   Phone # [phone]      Fax #[phone]



   Cody Dodson M.D. Director     [facility] # [id number]







Procedures







 Date  Code  Description  Status

 

 05/15/2015  66420  Colonoscopy W/ Snare RM Of Polyp/Tumor/Lesion  Completed







Encounters







 Type  Date  Location  Provider  Dx  Diagnosis

 

 Office Visit  2019  Gastroenterology  Jean Claude Ryan,  K62.5  Hemorrhage of



   10:30a  [facility]  DO    anus and rectum









 Z86.010  Personal history of colonic polyps

 

 D07.5  Carcinoma in situ of prostate









 Office Visit  2015  Gastroenterology  Tasha GATES  233.4  Carcinoma



   3:00p  [facility]  CHERYLE Polo-ROMAIN    Prostate









 V76.51  Special Screening For Malignant Neoplasms Colon

 

 530.81  Esophageal Reflux

## 2019-08-07 NOTE — XMS REPORT
Continuity of Care Document (CCD)

 Created on:2019



Patient:Desiree Omer

Sex:Male

:1945

External Reference #:[id number]





Demographics







 Address  [address]  [address]



   [place] [address]

 

 Mobile Phone  [phone]

 

 Preferred Language  en

 

 Marital Status  Not  or 

 

 Caodaism Affiliation  Unknown

 

 Race  White

 

 Ethnic Group  Not  or 









Author







 Name  Jean Claude Davis, DO

 

 Address  [address]



   Unavailable



   [place] [address]









Care Team Providers







 Name  Role  Phone

 

 Kellie Mcmanus PA  Care Team Information   Unavailable

 

 Hal Hinojosa MD  Primary Care Physician  Unavailable









Payers







 Date  Identification Numbers  Payment Provider  Subscriber

 

   Policy Number: [id number]  Medicare  Desiree Jacksonley









 PayID: [id number]  [facility]









 [address]

 

 [facility] [place] [address]









   Policy Number: [id number]  [facility] Health Care Option  Desiree MARU Omer









 PayID: [id number]  Claims, [address]









 [place] [address]









 Expires: 2019  Policy Number: [id number]  [facility]  Desiree MARU Omer









 PayID: [id number]  [address] [place] [address]







Problems







 Active Problems  Provider  Date

 

 Pure hypercholesterolemia  BRIDGETTE Carrizales  Onset: 2015

 

 Essential hypertension  BRIDGETTE Carrizales  Onset: 2015

 

 Gastroesophageal reflux disease  BRIDGETTE Carrizales  Onset: 2015

 

 Screening for malignant neoplasm of colon  BRIDGETTE Carrizales  Onset: 

 

 Carcinoma in situ of prostate  BRIDGETTE Carrizales  Onset: 2015







Social History







 Type  Date  Description  Comments

 

 Birth Sex    Unknown  

 

 Tobacco Use  Start: Unknown End: Unknown  Patient is a former smoker  

 

 Smoking Status  Reviewed: 19  Patient is a former smoker  







Allergies, Adverse Reactions, Alerts







 Active Allergies  Reaction  Severity  Comments  Date

 

 Statins        2015

 

 Fenofibrate        2019

 

 Flonase        2015

 

 Oxycodone        2019

 

 Rapaflo  High Blood Pressure      2019

 

 Simvastatin  Skin Rash      2019

 

 Colesevelam        2019







Medications







 Active Medications  SIG  Qnty  Indications  Ordering  Date



         Provider  

 

 Peg 3350/Electrolytes  use as directed  4000ml    Jean Claude RyanDO  2019



           



 240gm Solution Rec          



           

 

 Buspirone HCL  Take 1/2 Tablet  45tabs    Hal Hinojosa,  2019



             10mg  By Mouth Three      MD  



 Tablets  Times Daily        



           

 

 Triamterene/Hydrochlo  take 1/2 tablet  45tabs    Hal Hinojosa,  2019



 rothiazide  by mouth every      MD  



          37.5-25mg  day        



 Tablets          



           

 

 Losartan Potassium  Take 1 Tablet By  60tabs    Hal Hinojosa,  2015



                  50mg  Mouth One Time      MD  



 Tablets  Daily        



           

 

 Lisinopril  Take 2 Tablets By  180tabs    Munira Swanson NP  2012



          20mg Tablets  Mouth Every Day        



   as Directed        

 

 Verapamil HCL ER  1 tablet Am and  90tabs    Munira Swanson NP  2012



                240mg  1/2 tablet PM        



 Tablets ER          



           

 

 Klor-Con M20  1 by mouth twice  90tabs    Hal Hinojosa,  2009



            20Meq  daily      MD  



 Tablets ER          



           

 

 Metoprolol Succinate  1 by mouth every      Unknown  



 ER  day        



  25mg Tablets ER 24HR          



           

 

 Aspirin Adult Low  1 by mouth every      Unknown  



 Dose  day        



    81mg Tablets DR          



           

 

 Praluent  inject every two      Unknown  



        75mg/ml  weeks        



 Solution Pen-Inject          



           

 

 Amlodipine Besylate  1 by mouth every      Unknown  



                   5mg  day        



 Tablets          



           

 

 Alprazolam  take one tablet  30tabs    Hal Hinojosa,  



          0.5mg  by [id number] as      MD  



 Tablets  needed for        



   anxiety        

 

 Myrbetriq  1 by mouth every      Unknown  



         50mg Tablets  in the evening        



 ER 24HR          



           

 

 Cardura  1 tab qd      Unknown  



       1mg Tablets          



           

 

 Fenofibrate        Unknown  



           145mg          



 Tablets          



           

 

 PatNoel Gillespie MD  



       0.1% Solution          



           

 

 Naproxen        Unknown  



        500mg Tablets          



           

 

 Valacyclovir HCL        Unknown  



                1gm          



 Tablets          



           

 

 Prednisone        Unknown  



          5mg Tablets          



           

 

 Tamsulosin HCL        Kaity,  



              0.4mg        MD August  



 Capsules          



           

 

 Clonazepam        Hal Hinojosa,  



          0.5mg        MD  



 Tablets          



           

 

 Alfuzosin HCL ER        Kaity,  



                10mg        MD August  



 Tablets ER 24HR          



           









 History Medications









 Colyte With Flavor  as directed  4000ml    Suresh NAVJOTMiriam Barrow  2015 -



 Packs        MD  2019



     227.1gm Solution          



 Rec          

 

 Colyte-Flavor Packs  As directed  1units  233.4  Tasha Patinodipak,  2015 
-



                   4L        FNP-C  2015



 Solution Rec          



           

 

 Triamterene/Hydrochlo        Hal Hinojosa,   -



 rothiazide        MD  2019



          37.5-25mg          



 Tablets          



           

 

 Losartan Potassium        Maghaydah,   -



                  50mg        Qutaybeh, MD  2019



 Tablets          



           

 

 Lisinopril        Hal Hinojosa,   -



          20mg Tablets        MD  2019



           

 

 Verapamil HCL ER        Noel Morales MD   -



                240mg          2019



 Tablets ER          



           

 

 Klor-Con M20        Hal Hinojosa,   -



            20Meq        MD  2019



 Tablets ER          



           

 

 Oxycodone-Acetaminoph        Unknown   -



 en          2019



  5-325mg Tablets          



           







Vital Signs







 Date  Vital  Result  Comment

 

 2019 10:30am  Height  65 inches  5'5"









 Weight  194.00 lb  

 

 BP Systolic  138 mmHg  

 

 BP Diastolic  72 mmHg  

 

 Heart Rate  56 /min  

 

 BMI (Body Mass Index)  32.3 kg/m2  









 2015  2:58pm  Height  64 inches  5'4"









 Weight  194.00 lb  

 

 BP Systolic  128 mmHg  

 

 BP Diastolic  68 mmHg  

 

 Heart Rate  82 /min  

 

 BMI (Body Mass Index)  33.3 kg/m2  







Results







 Test  Date  Facility  Test  Result  H/L  Range  Note

 

 Laboratory test  2019  [facility]  Surgical  SEE RESULT      1, 2



 finding      Pathology  BELOW      

 

 CBC Auto Diff  2019  N2N/CCD Import  White Blood  5.7 10^3/uL    3.5-
10.8  



       Count        









 Red Blood Count  4.59 10^6/uL    4.18-5.48  

 

 Hemoglobin  14.5 g/dL    14-18  

 

 Hematocrit  42 %    42-52  

 

 Mean Corpuscular Volume  92 fL    80-94  

 

 Mean Corpuscular Hemoglobin  32 pg  High  27-31  

 

 Mean Corpuscular HGB Conc  35 g/dL    31-36  

 

 Red Cell Distribution Width  13 %    10.5-15  

 

 Platelet Count  216 10^3/uL    150-450  

 

 Mean Platelet Volume  8.1 fL    7.4-10.4  

 

 Abs Neutrophils  3.4 10^3/uL    1.5-7.7  

 

 Abs Lymphocytes  1.5 10^3/uL    1-4.8  

 

 Abs Monocytes  0.6 10^3/uL    0-0.8  

 

 Abs Eosinophils  0.1 10^3/uL    0-0.6  

 

 Abs Basophils  0.0 10^3/uL    0-0.2  

 

 Abs Nucleated RBC  0.0 10^3/uL      

 

 Granulocyte %  59.9 %      

 

 Lymphocyte %  26.8 %      

 

 Monocyte %  10.5 %      

 

 Eosinophil %  2.1 %      

 

 Basophil %  0.7 %      

 

 Nucleated Red Blood Cells %  0.1 1      









 Inr/Protime  2019  N2N/CCD Import  Inr  0.89 1    0.82-1.09  3

 

 Lab Results  2019  N2N/CCD Import  Partial Thrombo  28.4 s    26-36.3  



       Time PTT        

 

 Comp Metabolic  2019  N2N/CCD Import  Sodium  138 mmol/L    135-145  



 Panel              









 Potassium  3.8 mmol/L    3.5-5  

 

 Chloride  107 mmol/L    101-111  

 

 Co2 Carbon Dioxide  25 mmol/L    22-32  

 

 Anion Gap  6 mmol/L    2-11  

 

 Glucose  106 mg/dL  High    

 

 Blood Urea Nitrogen  10 mg/dL    6-24  

 

 Creatinine  0.81 mg/dL    0.67-1.17  

 

 BUN/Creatinine Ratio  12.3 1    8-20  

 

 Calcium  8.9 mg/dL    8.6-10.3  

 

 Total Protein  6.5 g/dL    6.4-8.9  

 

 Albumin  4.2 g/dL    3.2-5.2  

 

 Globulin  2.3 g/dL    2-4  

 

 Albumin/Globulin Ratio  1.8 1    1-3  

 

 Total Bilirubin  0.70 mg/dL    0.2-1  

 

 Alkaline Phosphatase  62 U/L      

 

 Alt  15 U/L    7-52  

 

 Ast  18 U/L    13-39  

 

 Egfr Non-  92.9 1      

 

 Egfr   112.4 1      4









 Lab Results  2019  N2N/CCD Import  Magnesium  2.2 mg/dL    1.9-2.7  









 Troponin I  0.00 ng/mL      5

 

 TSH (Thyroid Stim Horm)  2.75 mcIU/mL    0.34-5.6  









 Lab Results  2019  N2N/CCD Import  Magnesium  2.2 mg/dL    1.9-2.7  









 Troponin I  0.00 ng/mL      6

 

 TSH (Thyroid Stim Horm)  3.37 mcIU/mL    0.34-5.6  









 Comp Metabolic Panel  2019  N2N/CCD Import  Sodium  141 mmol/L    135-
145  









 Potassium  3.6 mmol/L    3.5-5  

 

 Chloride  108 mmol/L    101-111  

 

 Co2 Carbon Dioxide  26 mmol/L    22-32  

 

 Anion Gap  7 mmol/L    2-11  

 

 Glucose  101 mg/dL  High    

 

 Blood Urea Nitrogen  11 mg/dL    6-24  

 

 Creatinine  0.79 mg/dL    0.67-1.17  

 

 BUN/Creatinine Ratio  13.9 1    8-20  

 

 Calcium  8.8 mg/dL    8.6-10.3  

 

 Total Protein  6.1 g/dL  Low  6.4-8.9  

 

 Albumin  4.0 g/dL    3.2-5.2  

 

 Globulin  2.1 g/dL    2-4  

 

 Albumin/Globulin Ratio  1.9 1    1-3  

 

 Total Bilirubin  0.60 mg/dL    0.2-1  

 

 Alkaline Phosphatase  56 U/L      

 

 Alt  15 U/L    7-52  

 

 Ast  16 U/L    13-39  

 

 Egfr Non-  95.6 1      

 

 Egfr   115.7 1      7









 Lab Results  2019  N2N/Delta ID Import  Lactic Acid  1.2 mmol/L    0.5-2  8

 

 CBC Auto Diff  2019  N2N/CCD Import  White Blood Count  6.1 10^3/uL    
3.5-10.8  









 Red Blood Count  4.30 10^6/uL    4.18-5.48  

 

 Hemoglobin  13.4 g/dL  Low  14-18  

 

 Hematocrit  39 %    36-46  

 

 Mean Corpuscular Volume  91 fL    80-94  

 

 Mean Corpuscular Hemoglobin  31 pg    27-31  

 

 Mean Corpuscular HGB Conc  34 g/dL    31-36  

 

 Red Cell Distribution Width  14 %    10.5-15  

 

 Platelet Count  198 10^3/uL    150-450  

 

 Mean Platelet Volume  8.1 fL    7.4-10.4  

 

 Abs Neutrophils  4.0 10^3/uL    1.5-7.7  

 

 Abs Lymphocytes  1.3 10^3/uL    1-4.8  

 

 Abs Monocytes  0.6 10^3/uL    0-0.8  

 

 Abs Eosinophils  0.2 10^3/uL    0-0.6  

 

 Abs Basophils  0 10^3/uL    0-0.2  

 

 Abs Nucleated RBC  0 10^3/uL      

 

 Granulocyte %  65.6 %      

 

 Lymphocyte %  21.1 %      

 

 Monocyte %  10.0 %      

 

 Eosinophil %  2.6 %      

 

 Basophil %  0.7 %      

 

 Nucleated Red Blood Cells %  0 1      









 CBC Auto Diff  03/10/2019  [employer]/Delta ID Import  White Blood Count  6.8 10^3/uL    
3.5-10.8  









 Red Blood Count  4.24 10^6/uL    4-5.4  

 

 Hemoglobin  13.2 g/dL  Low  14-18  

 

 Hematocrit  39 %  Low  42-52  

 

 Mean Corpuscular Volume  92 fL    80-94  

 

 Mean Corpuscular Hemoglobin  31 pg    27-31  

 

 Mean Corpuscular HGB Conc  34 g/dL    31-36  

 

 Red Cell Distribution Width  14 %    10.5-15  

 

 Platelet Count  195 10^3/uL    150-450  

 

 Mean Platelet Volume  8.3 fL    7.4-10.4  

 

 Abs Neutrophils  4.3 10^3/uL    1.5-7.7  

 

 Abs Lymphocytes  1.4 10^3/uL    1-4.8  

 

 Abs Monocytes  0.7 10^3/uL    0-0.8  

 

 Abs Eosinophils  0.3 10^3/uL    0-0.6  

 

 Abs Basophils  0.1 10^3/uL    0-0.2  

 

 Abs Nucleated RBC  0 10^3/uL      

 

 Granulocyte %  64.0 %      

 

 Lymphocyte %  21.3 %      

 

 Monocyte %  10.2 %      

 

 Eosinophil %  3.8 %      

 

 Basophil %  0.7 %      

 

 Nucleated Red Blood Cells %  0.1 1      









 Inr/Protime  03/10/2019  [employer]/Delta ID Import  Inr  0.85 1    0.77-1.02  

 

 Lab Results  03/10/2019  [employer] [facility]/Delta ID Import  Partial  25.4 s  Low  26-36.3  



       Thrombo Time        



       PTT        

 

 Comp Metabolic  03/10/2019  [employer] [facility]/Delta ID Import  Sodium  137 mmol/L    135-145  



 Panel              









 Potassium  3.9 mmol/L    3.5-5  

 

 Chloride  105 mmol/L    101-111  

 

 Co2 Carbon Dioxide  27 mmol/L    22-32  

 

 Anion Gap  5 mmol/L    2-11  

 

 Glucose  93 mg/dL      

 

 Blood Urea Nitrogen  14 mg/dL    6-24  

 

 Creatinine  0.82 mg/dL    0.67-1.17  

 

 BUN/Creatinine Ratio  17.1 1    8-20  

 

 Calcium  8.5 mg/dL  Low  8.6-10.3  

 

 Total Protein  6.1 g/dL  Low  6.4-8.9  

 

 Albumin  3.8 g/dL    3.2-5.2  

 

 Globulin  2.3 g/dL    2-4  

 

 Albumin/Globulin Ratio  1.7 1    1-3  

 

 Total Bilirubin  0.50 mg/dL    0.2-1  

 

 Alkaline Phosphatase  49 U/L      

 

 Alt  13 U/L    7-52  

 

 Ast  19 U/L    13-39  

 

 Egfr Non-  91.6 1      

 

 Egfr   110.8 1      9









 Laboratory test  05/15/2015  [facility]  Surgical Interface  SEE RESULT      10



 finding      Order  BELOW      

 

 Xray  2015  [facility] Radiology  NM Bone Scan -  <pending>      



       Total Body        









 1  [id number]

 

 2  SEE RESULT BELOW



   -----------------------------------------------------------------------------
---------------



   Name:  DESIREE OMER                    : 1945    Attend Dr: Jean Claude Davis DO



   Acct:  [id number]  Unit: [id number]  AGE: 73            Location:  [facility]



   Re19                        SEX: M             Status:    DEP REF



   -----------------------------------------------------------------------------
---------------



   



   SPEC: [id number]             RADHA:       [facility] DR: Jean Claude Davis DO



   REQ:  [id number]             RECD: 



   STATUS: JASON DELGADO DR: Hal Briceno MD



   _



   ORDERED:  LEVEL 4



   COMMENTS: [id number]



   



   FINAL DIAGNOSIS



   



   



   Colon, rectum, biopsy:



   -- Hyperplastic polyp.



   



   



   -----------------------------------------------------------------------------
---------------



   



   



   



   POST-OPERATIVE DIAGNOSIS



   



   Colonoscopy: to cecum; very tortuous; good prep; arteriovenous malformations
; rectal



   bleeding; external and internal hemorrhoids; biopsy polypectomy rectum



   



   



   GROSS DESCRIPTION



   



   The specimen is received in formalin labeled, Biopsy Rectal Polyp, and 
consists of a 0.4 x



   0.2 x 0.2 cm tan-pink polypoid soft tissue fragment which is submitted 
entirely in one



   cassette.



   



   Signed by and Reported on: __________              Cody Dodson MD  [id number]



   



   



   -----------------------------------------------------------------------------
---------------



   



   



   



   



   



   



   



   



   ** END OF REPORT **



   



   DEPARTMENT OF PATHOLOGY,  [address] [place] [address]



   Phone # [phone]      Fax #[phone]



   Cody Dodson M.D. Director     [facility] # [id number]



   



   SEE RESULT BELOW



   -----------------------------------------------------------------------------
---------------



   Name:  DESIREE OMER                    : 1945    Attend Dr: Jean Claude Davis DO



   Acct:  [id number]  Unit: [id number]  AGE: 73            Location:  [facility]



   Re19                        SEX: M             Status:    DEP REF



   -----------------------------------------------------------------------------
---------------



   



   SPEC: [id number]             RADHA: 4      [facility] DR: Jean Claude Davis DO



   REQ:  [id number]             RECD: 



   STATUS: JASON DELGADO DR: Hal Briceno MD



   _



   ORDERED:  LEVEL 4



   COMMENTS: [id number]



   



   FINAL DIAGNOSIS



   



   



   Colon, rectum, biopsy:



   -- Hyperplastic polyp.



   



   



   -----------------------------------------------------------------------------
---------------



   



   



   



   POST-OPERATIVE DIAGNOSIS



   



   Colonoscopy: to cecum; very tortuous; good prep; arteriovenous malformations
; rectal



   bleeding; external and internal hemorrhoids; biopsy polypectomy rectum



   



   



   GROSS DESCRIPTION



   



   The specimen is received in formalin labeled, Biopsy Rectal Polyp, and 
consists of a 0.4 x



   0.2 x 0.2 cm tan-pink polypoid soft tissue fragment which is submitted 
entirely in one



   cassette.



   



   Signed by and Reported on: __________              Cody Dodson MD  8767



   



   



   -----------------------------------------------------------------------------
---------------



   



   



   



   



   



   



   



   



   ** END OF REPORT **



   



   DEPARTMENT OF PATHOLOGY,  [address] [place] [address]



   Phone # [phone]      Fax #[phone]



   Cody Dodson M.D. Director     [facility] # [id number]

 

 3  Standard intensity warfarin therapeutic range: 2.0-3.0



   High intensity warfarin therapeutic range: 2.5-3.5

 

 4  *******Because ethnic data is not always readily available,



   this report includes an eGFR for both -Americans and



   non- Americans.****



   The National Kidney Disease Education Program (NKDEP) does



   not endorse the use of the MDRD equation for patients that



   are not between the ages of 18 and 70, are pregnant, have



   extremes of body size, muscle mass, or nutritional status,



   or are non- or non-.



   According to the National Kidney Foundation, irrespective of



   diagnosis, the stage of the disease is based on the level of



   kidney function:



   Stage Description                      GFR(mL/min/1.73 m(2))



   1     Kidney damage with normal or decreased GFR       90



   2     Kidney damage with mild decrease in GFR          60-89



   3     Moderate decrease in GFR                         30-59



   4     Severe decrease in GFR                           15-29



   5     Kidney failure                       <15 (or dialysis)

 

 5  Troponin-I testing on Plasma Separator Tubes (PST) has a



   known false positive rate of 0.20-0.40%.  All positive



   troponins reflex immediately to secondary confirmatory



   testing.



   



   Using the [employer] DxI 800 Access Immunoassay systems, the



   99th percentile upper reference limit was demonstrated to be



   < 0.03 ng/mL.

 

 6  Troponin-I testing on Plasma Separator Tubes (PST) has a



   known false positive rate of 0.20-0.40%.  All positive



   troponins reflex immediate secondary confirmatory testing.

 

 7  *******Because ethnic data is not always readily available,



   this report includes an eGFR for both -Americans and



   non- Americans.****



   The National Kidney Disease Education Program (NKDEP) does



   not endorse the use of the MDRD equation for patients that



   are not between the ages of 18 and 70, are pregnant, have



   extremes of body size, muscle mass, or nutritional status,



   or are non- or non-.



   According to the National Kidney Foundation, irrespective of



   diagnosis, the stage of the disease is based on the level of



   kidney function:



   Stage Description                      GFR(mL/min/1.73 m(2))



   1     Kidney damage with normal or decreased GFR       90



   2     Kidney damage with mild decrease in GFR          60-89



   3     Moderate decrease in GFR                         30-59



   4     Severe decrease in GFR                           15-29



   5     Kidney failure                       <15 (or dialysis)

 

 8  [facility] Severe Sepsis and Septic Shock Management Bundle Measure



   requires all lactic acids initially measuring >2.0 mmol/L be



   repeated.

 

 9  *******Because ethnic data is not always readily available,



   this report includes an eGFR for both -Americans and



   non- Americans.****



   The National Kidney Disease Education Program (NKDEP) does



   not endorse the use of the MDRD equation for patients that



   are not between the ages of 18 and 70, are pregnant, have



   extremes of body size, muscle mass, or nutritional status,



   or are non- or non-.



   According to the National Kidney Foundation, irrespective of



   diagnosis, the stage of the disease is based on the level of



   kidney function:



   Stage Description                      GFR(mL/min/1.73 m(2))



   1     Kidney damage with normal or decreased GFR       90



   2     Kidney damage with mild decrease in GFR          60-89



   3     Moderate decrease in GFR                         30-59



   4     Severe decrease in GFR                           15-29



   5     Kidney failure                       <15 (or dialysis)

 

 10  SEE RESULT BELOW



   -----------------------------------------------------------------------------
---------------



   Name:  DESIREE OMER                    : 1945    Attend Dr: Suresh Barrow MD



   Acct:  [id number]  Unit: [id number]  AGE: 69            Location:  [facility]



   Re/15/15                        SEX: M             Status:    REG REF



   -----------------------------------------------------------------------------
---------------



   



   SPEC: [id number]             RADHA: 05/15/      [facility] DR: Suresh Barrow MD



   REQ:  [id number]             RECD: 05/15/



   STATUS: JASON DELGADO DR: Hal Hinojosa MD



   _



   ORDERED:  LEVEL IV/2



   



   FINAL DIAGNOSIS



   



   



   1.  Colon, right, biopsy:



   -- Sessile serrated adenoma.



   -- No high-grade dysplasia identified.



   



   2.   Colon, 35 cm, biopsy:



   -- Tubular adenoma.



   -- No high grade dysplasia or malignancy.



   



   



   



   



   



   CLINICAL HISTORY



   



   Screening colonoscopy



   



   POST-OPERATIVE DIAGNOSIS



   



   Screening colonoscopy into terminal ileum, prep good.  Three polyps removed 
with cold snare.



   Conclusion: Three polyps removed.



   



   GROSS DESCRIPTION



   



   1.   The specimen is received in formalin labeled, Right Colon Polyp, and 
consists of a 0.8



   x 0.7 x 0.2 cm aggregate of multiple tan-pink irregular soft tissue fragments
, which is



   submitted entirely in one cassette.



   



   2.   The specimen is received in formalin labeled, Colon Polyps at 35 cm, 
and consists of



   three tan white irregular to polypoid soft tissue fragments ranging from 0.2 
x 0.1 cm by



   less than 0.1 cm to 0.3 x 0.3 x 0.3 cm, which are submitted entirely in one 
cassette.



   



   Signed __________(signature on file)___________ Cody Dodson MD 05/18/
15 1328



   



   -----------------------------------------------------------------------------
---------------



   



   ** END OF REPORT **



   



   * ML=Testing performed at Main Lab



   DEPARTMENT OF PATHOLOGY,  [address] [place] [address]



   Phone # [phone]      Fax #[phone]



   Cody Dodson M.D. Director     [facility] # [id number]







Procedures







 Date  Code  Description  Status

 

 05/15/2015  94150  Colonoscopy W/ Snare RM Of Polyp/Tumor/Lesion  Completed







Encounters







 Type  Date  Location  Provider  Dx  Diagnosis

 

 Office Visit  2019  Gastroenterology  Jean Claude Ryan,  K62.5  Hemorrhage of



   10:30a  [facility]  DO    anus and rectum









 Z86.010  Personal history of colonic polyps

 

 D07.5  Carcinoma in situ of prostate









 Office Visit  2015  Gastroenterology  Tasha GATES  233.4  Carcinoma



   3:00p  [facility]  CHERYLE Polo-ROMAIN    Prostate









 V76.51  Special Screening For Malignant Neoplasms Colon

 

 530.81  Esophageal Reflux

## 2019-08-07 NOTE — UC
Laceration HPI





- HPI Summary


HPI Summary: 


73-year-old male presents with complaints of laceration to his left ring 

finger.  States he was placing a metal box inside a metal filing cabinet, his 

finger became pinched between the edge of the box and edge of the cabinet, and 

he thinks he cut it on a piece of the metal cabinet.  This occurred just prior 

to arrival.  Bleeding was controlled with direct pressure prior to arrival.  

States tetanus is up-to-date. Has full ROM to finger. Denies any numbness or 

tingling. 








- History Of Current Complaint


Chief Complaint: UCLaceration


Stated Complaint: LEFT FINGER LAC


Time Seen by Provider: 08/07/19 18:36


Hx Obtained From: Patient


Pain Intensity: 1





- Allergies/Home Medications


Allergies/Adverse Reactions: 


 Allergies











Allergy/AdvReac Type Severity Reaction Status Date / Time


 


cholestyramine Allergy Intermediate Rash Verified 08/07/19 18:43


 


fenofibrate Allergy Intermediate Rash Verified 08/07/19 18:43


 


gemfibrozil Allergy Intermediate Unknown Verified 08/07/19 18:43





   Reaction  





   Details  


 


simvastatin [From Zocor] Allergy Intermediate Rash Verified 08/07/19 18:43


 


Statins-Hmg-Coa Reductase Allergy Intermediate Rash Verified 08/07/19 18:43





Inhibitor     


 


niacin Allergy Unknown Unknown Verified 08/07/19 18:43





   Reaction  





   Details  


 


fluticasone [From Flonase] Allergy  Unknown Verified 08/07/19 18:43





   Reaction  





   Details  


 


hydrocodone Allergy  Rash And Verified 08/07/19 18:43





   Itching  


 


oxycodone Allergy  Rash Verified 08/07/19 18:43


 


silodosin [From Rapaflo] Allergy  Unknown Verified 08/07/19 18:43





   Reaction  





   Details  


 


tramadol Allergy  Rash And Verified 08/07/19 18:43





   Itching  


 


colesevelam [From WelChol] AdvReac  Diarrhea Verified 08/07/19 18:43


 


cat dander* Allergy Intermediate Sneezing Uncoded 08/07/19 18:43














PMH/Surg Hx/FS Hx/Imm Hx


Endocrine History: Dyslipidemia


Cardiovascular History: Hypertension


GI/ History: Other - BPH


Neurological History: TIA


Psychological History: Anxiety


Other History Of: 


   Negative For: HIV, Anticoagulant Therapy





- Surgical History


Surgical History: Yes


Surgery Procedure, Year, and Place: 1990's Deviated septum surgery;.  2012 Jan 

- Bone spur removal Left foot.  VASECTOMY.  colonoscopy w/snare polypectomy 

2015.  Ling event monitor implantation 2/2017.  Hand  repair  3/2019.  bilat 

cataract extraction with IOL 2018





- Family History


Known Family History: Positive: Cardiac Disease, Diabetes


Family History: Fhx of HLD





- Social History


Occupation: Retired


Lives: With Family


Alcohol Use: None


Alcohol Amount: stopped after starting medications


Substance Use Type: None


Smoking Status (MU): Never Smoked Tobacco





- Immunization History


Most Recent Influenza Vaccination: 2016/2017





Review of Systems


All Other Systems Reviewed And Are Negative: Yes


Constitutional: Negative: Fever, Chills


Skin: Positive: Other - See HPI


Respiratory: Positive: Negative


Cardiovascular: Positive: Negative


Gastrointestinal: Positive: Negative


Genitourinary: Positive: Negative


Motor: Negative: Weakness


Neurovascular: Negative: Decreased Sensation


Musculoskeletal: Negative: Decreased ROM


Neurological: Positive: Negative


Is Patient Immunocompromised?: No





Physical Exam





- Summary


Physical Exam Summary: 


GENERAL APPEARANCE: Well developed, well nourished, alert and cooperative, and 

appears to be in no acute distress.





CARDIAC: Normal S1 and S2. No S3, S4 or murmurs. Rhythm is regular. There is no 

peripheral edema, cyanosis or pallor. Extremities are warm and well perfused. 

Capillary refill is less than 2 seconds. Peripheral pulses intact.





LUNGS: Clear to auscultation without rales, rhonchi, wheezing or diminished 

breath sounds.





ABDOMEN: Positive bowel sounds. Soft, nondistended, nontender. No guarding or 

rebound. No masses or hepatosplenomegally.





MUSKULOSKELETAL: ROM intact to all extremities. No joint erythema or 

tenderness. Normal muscular development. Normal gait.





EXTREMITIES: Superficial J-shaped flap laceration to the medial aspect of the 

distal left ring finger without nail involvement.





SKIN: Skin normal color, texture and turgor.





Triage Information Reviewed: Yes


Vital Signs: 


 Initial Vital Signs











Temp  97.5 F   08/07/19 18:33


 


Pulse  75   08/07/19 18:33


 


Resp  16   08/07/19 18:33


 


BP  168/85   08/07/19 18:33


 


Pulse Ox  99   08/07/19 18:33











Vital Signs Reviewed: Yes





Laceration Repair





- Laceration Repair


  ** 1


Description: Linear - J-shaped flap laceration


Laceration Size After Repair: Length (cm) - 1


Cleansing Completed Via Routine Prep: Yes


Closure Material: Skin Adhesive, SteriStrips





Laceration Course/Dx





- Course/Dx


Course Of Treatment: 


73-year-old male presents with complaints of laceration to his left ring 

finger.  States he was placing a metal box inside a metal filing cabinet, his 

finger became pinched between the edge of the box and edge of the cabinet, and 

he thinks he cut it on a piece of the metal cabinet.  This occurred just prior 

to arrival.  Bleeding was controlled with direct pressure prior to arrival.  

States tetanus is up-to-date. Has full ROM to finger. Denies any numbness or 

tingling.  Afebrile.  Tends otherwise vital signs stable.  Patient had a small 

superficial J-shaped flap laceration to the medial aspect of the distal left 

ring finger without nail involvement.  The wound was cleansed and then the 

wound was repaired using a single 1/8 inch Steri-Strip and skin adhesive.  

Patient tolerated the procedure well.  Wound care, anticipatory guidance, and 

warning symptoms were reviewed with the patient.  Verbalizes understanding and 

agrees with plan of care.








- Differential Dx - Laceration/Wound


Differental Diagnoses: Avulsion, Laceration





- Diagnosis


Provider Diagnosis: 


 Laceration of left ring finger








Discharge





- Sign-Out/Discharge


Documenting (check all that apply): Patient Departure


All imaging exams completed and their final reports reviewed: No Studies





- Discharge Plan


Condition: Stable


Disposition: HOME


Patient Education Materials:  Laceration (ED), Skin Adhesive Care (ED), 

Steristrips (ED)


Referrals: 


Hal Hinojosa MD [Primary Care Provider] - 


Additional Instructions: 


Your laceration as repaired with a combination of skin adhesive and Steri-

Strips.





The adhesive will slowly wear off over the next several days.





Keep the adhesive dry for the next 24 hours. After 24 hours you may shower and 

wash your hands as ususal.





Do not apply any lotions aor ointments to the adhesive as this may dissolve the 

adhesive and cause the wound to reopen.





The Steri-Strips will slowly peel up from the ends over the next few days. You 

may trim the ends as needed but do not pull off or you may reopen the wound.





Keep the wound covered with a dressing. Change this at least once a day or 

anytime the dressing becomes wet or soiled.





Take acetaminophen (Tylenol) or ibuprofen (Advil, Motrin) according to 

directions as needed for pain.





Watch for signs of infection including fever greater than 100.5 F, severe pain 

not managed with with pain medicine, redness that spreads, swelling of the 

finger, pus draining from the wound, or any worsening of symptoms. Seek 

immediate medical attention if any of these occur.





- Billing Disposition and Condition


Condition: STABLE


Disposition: Home





- Attestation Statements


Provider Attestation: 





Per institutional requirements, I have reviewed the chart, however, I was not 

consulted specifically or made aware of this patient by the  midlevel provider.

  I did not personally evaluate, interact with , or disposition  this patient.

## 2019-09-27 ENCOUNTER — HOSPITAL ENCOUNTER (EMERGENCY)
Dept: HOSPITAL 25 - UCEAST | Age: 74
Discharge: HOME | End: 2019-09-27
Payer: MEDICARE

## 2019-09-27 VITALS — DIASTOLIC BLOOD PRESSURE: 74 MMHG | SYSTOLIC BLOOD PRESSURE: 134 MMHG

## 2019-09-27 DIAGNOSIS — J45.909: ICD-10-CM

## 2019-09-27 DIAGNOSIS — J01.90: Primary | ICD-10-CM

## 2019-09-27 DIAGNOSIS — I10: ICD-10-CM

## 2019-09-27 DIAGNOSIS — Z85.46: ICD-10-CM

## 2019-09-27 DIAGNOSIS — Z79.82: ICD-10-CM

## 2019-09-27 PROCEDURE — G0463 HOSPITAL OUTPT CLINIC VISIT: HCPCS

## 2019-09-27 PROCEDURE — 99212 OFFICE O/P EST SF 10 MIN: CPT

## 2019-09-27 NOTE — UC
Respiratory Complaint HPI





- HPI Summary


HPI Summary: 





2 WEEKS OF COUGH, RHINITIS, SINUS PRESSURE AND POSTNASAL DRAINAGE.  NOT 

IMPROVING.  PATIENT DENIES FEVER, NAUSEA/VOMITING.  STATES HE HAD ONE EPISODE 

OF LOOSE STOOLS THIS MORNING WHICH PROMPTED HIM TO SEEK EVALUATION.





- History of Current Complaint


Chief Complaint: UCGeneralIllness


Stated Complaint: COUGH


Time Seen by Provider: 09/27/19 08:39


Hx Obtained From: Patient


Onset/Duration: Gradual Onset, Lasting Weeks, Still Present


Timing: Constant


Severity Initially: Moderate


Severity Currently: Moderate


Pain Intensity: 0


Pain Scale Used: 0-10 Numeric


Character: Cough: Nonproductive


Aggravating Factors: Nothing


Alleviating Factors: Nothing


Associated Signs And Symptoms: Positive: URI, Nasal Congestion, Sinus 

Discomfort.  Negative: Fever, Wheezing





- Allergies/Home Medications


Allergies/Adverse Reactions: 


 Allergies











Allergy/AdvReac Type Severity Reaction Status Date / Time


 


cholestyramine Allergy Intermediate Rash Verified 09/27/19 08:44


 


fenofibrate Allergy Intermediate Rash Verified 09/27/19 08:44


 


gemfibrozil Allergy Intermediate Unknown Verified 09/27/19 08:44





   Reaction  





   Details  


 


simvastatin [From Zocor] Allergy Intermediate Rash Verified 09/27/19 08:44


 


Statins-Hmg-Coa Reductase Allergy Intermediate Rash Verified 09/27/19 08:44





Inhibitor     


 


niacin Allergy Unknown Unknown Verified 09/27/19 08:44





   Reaction  





   Details  


 


fluticasone [From Flonase] Allergy  Unknown Verified 09/27/19 08:44





   Reaction  





   Details  


 


hydrocodone Allergy  Rash And Verified 09/27/19 08:44





   Itching  


 


oxycodone Allergy  Rash Verified 09/27/19 08:44


 


silodosin [From Rapaflo] Allergy  Unknown Verified 09/27/19 08:44





   Reaction  





   Details  


 


tramadol Allergy  Rash And Verified 09/27/19 08:44





   Itching  


 


colesevelam [From WelChol] AdvReac  Diarrhea Verified 09/27/19 08:44


 


cat dander* Allergy Intermediate Sneezing Uncoded 09/27/19 08:44











Home Medications: 


 Home Medications





ALPRAZolam TAB* [Xanax TAB*] 0.5 mg PO BEDTIME PRN 09/27/19 [History Confirmed 

09/27/19]


Aspirin EC TAB* [Ecotrin EC Low Dose 81 MG*] 81 mg PO DAILY 09/27/19 [History 

Confirmed 09/27/19]


Ranitidine TAB (NF) [Zantac TAB (NF)] 150 mg PO BID PRN 09/27/19 [History 

Confirmed 09/27/19]











PMH/Surg Hx/FS Hx/Imm Hx


Cardiovascular History: Cardiac Disease, Hypertension


Respiratory History: Asthma


Cancer History: Prostate Cancer


Other History Of: 


   Negative For: HIV, Anticoagulant Therapy





- Surgical History


Surgical History: Yes


Surgery Procedure, Year, and Place: 1990's Deviated septum surgery;.  2012 Jan 

- Bone spur removal Left foot.  VASECTOMY.  colonoscopy w/snare polypectomy 

2015.  Ling event monitor implantation 2/2017.  Hand  repair  3/2019.  bilat 

cataract extraction with IOL 2018





- Family History


Known Family History: Positive: Cardiac Disease, Diabetes


Family History: Fhx of HLD





- Social History


Alcohol Use: None


Alcohol Amount: stopped after starting medications


Substance Use Type: None


Smoking Status (MU): Never Smoked Tobacco





- Immunization History


Most Recent Influenza Vaccination: 2016/2017





Review of Systems


All Other Systems Reviewed And Are Negative: Yes


Constitutional: Positive: Negative


ENT: Positive: Nasal Discharge, Sinus Congestion


Respiratory: Positive: Cough


Cardiovascular: Positive: Negative


Gastrointestinal: Positive: Diarrhea


Neurological: Positive: Negative





Physical Exam


Triage Information Reviewed: Yes


Appearance: Well-Appearing, No Pain Distress, Well-Nourished


Vital Signs: 


 Initial Vital Signs











Temp  97 F   09/27/19 08:38


 


Pulse  55   09/27/19 08:38


 


Resp  16   09/27/19 08:38


 


BP  134/74   09/27/19 08:38


 


Pulse Ox  98   09/27/19 08:38











Vital Signs Reviewed: Yes


Eyes: Positive: Conjunctiva Clear


ENT: Positive: Hearing grossly normal, Pharynx normal, TMs normal


Neck: Positive: Supple, Nontender, No Lymphadenopathy


Respiratory Exam: Normal


Cardiovascular: Positive: RRR, Pulses Normal, Murmur:Sys:Grade _?_/VI - 2/6


Abdomen Description: Positive: Soft


Musculoskeletal: Positive: No Edema


Neurological: Positive: Alert


Psychological: Positive: Age Appropriate Behavior


Skin: Negative: Rashes





Respiratory Course/Dx





- Course


Course Of Treatment: 





LIKELY VIRAL RESPIRATORY INFECTION HOWEVER PATIENT WITH 2 WEEKS OF SYMPTOMS AND 

STATES NO IMPROVEMENT.  GIVEN AGE AND COMORBIDITIES WILL COVER WITH ANTIBIOTIC.

  WILL STAY AWAY FROM MORE BROAD-SPECTRUM AGENTS GIVEN PATIENT'S LOOSE STOOLS 

TODAY.  FOLLOW-UP WITH PCP IF NOT IMPROVING AS EXPECTED.





- Differential Dx/Diagnosis


Provider Diagnosis: 


 Sinusitis, acute








Discharge ED





- Sign-Out/Discharge


Documenting (check all that apply): Patient Departure


All imaging exams completed and their final reports reviewed: No Studies





- Discharge Plan


Condition: Stable


Disposition: HOME


Prescriptions: 


Amoxicillin PO (*) [Amoxicillin 500 MG CAP*] 500 mg PO Q12H #20 cap


Patient Education Materials:  Sinusitis (ED)


Referrals: 


Hal Hinojosa MD [Primary Care Provider] - If Needed


Additional Instructions: 


YOUR SYMPTOMS MAY BE VIRALLY MEDIATED BUT GIVEN THE LENGTH OF TIME YOU HAVE 

BEEN ILL WE WILL COVER YOU WITH ANTIBIOTICS. IF YOU START THE MEDICINE BE SURE 

TO TAKE IT FOR THE FULL COURSE. REST, HYDRATE, OTC MEDS AS NEEDED. SEEK FOLLOW-

UP WITH YOUR PCP IF YOU ARE NOT IMPROVING OVER THE NEXT 1-2 WEEKS. 





USE OTC AFRIN FOR NASAL CONGESTION. 2 SPRAYS IN EACH NOSTRIL TWICE DAILY AS 

NEEDED. DO NOT USE FOR MORE THAN 3-4 DAYS IN A ROW TO PREVENT DEVELOPING 

REBOUND CONGESTION.





- Billing Disposition and Condition


Condition: STABLE


Disposition: Home